# Patient Record
Sex: FEMALE | Race: WHITE | Employment: FULL TIME | URBAN - METROPOLITAN AREA
[De-identification: names, ages, dates, MRNs, and addresses within clinical notes are randomized per-mention and may not be internally consistent; named-entity substitution may affect disease eponyms.]

---

## 2017-01-03 ENCOUNTER — TELEPHONE (OUTPATIENT)
Dept: UROLOGY | Facility: HOSPITAL | Age: 77
End: 2017-01-03

## 2017-01-03 NOTE — TELEPHONE ENCOUNTER
Called patient to confirm apt, pt will cancel the apt for now, states went to see urogyn at [facility] and is not in a study for OAB and trying a new medication, will call back if she wants to return

## 2017-12-14 ENCOUNTER — PATIENT MESSAGE (OUTPATIENT)
Dept: NEUROLOGY | Facility: CLINIC | Age: 77
End: 2017-12-14

## 2017-12-15 NOTE — TELEPHONE ENCOUNTER
Attempted to fax EMG report to patient's requested fax number ([phone]) x 2, and both were undeliverable. Contacted patient at [phone] and explained we had been unable to send the report via fax. She asked that it be mailed.   Placed in outgoing

## 2018-06-28 ENCOUNTER — TELEPHONE (OUTPATIENT)
Dept: UROLOGY | Facility: HOSPITAL | Age: 78
End: 2018-06-28

## 2018-06-28 RX ORDER — ESTRADIOL 0.1 MG/G
CREAM VAGINAL
Qty: 1 TUBE | Refills: 3 | Status: SHIPPED | OUTPATIENT
Start: 2018-06-28

## 2018-09-28 ENCOUNTER — OFFICE VISIT (OUTPATIENT)
Dept: UROLOGY | Facility: HOSPITAL | Age: 78
End: 2018-09-28
Attending: OBSTETRICS & GYNECOLOGY
Payer: MEDICARE

## 2018-09-28 VITALS — SYSTOLIC BLOOD PRESSURE: 126 MMHG | DIASTOLIC BLOOD PRESSURE: 68 MMHG

## 2018-09-28 DIAGNOSIS — N95.2 POSTMENOPAUSAL ATROPHIC VAGINITIS: ICD-10-CM

## 2018-09-28 DIAGNOSIS — N81.11 CYSTOCELE, MIDLINE: ICD-10-CM

## 2018-09-28 DIAGNOSIS — N32.81 OAB (OVERACTIVE BLADDER): Primary | ICD-10-CM

## 2018-09-28 PROCEDURE — 99211 OFF/OP EST MAY X REQ PHY/QHP: CPT

## 2018-09-28 RX ORDER — TROSPIUM CHLORIDE ER 60 MG/1
60 CAPSULE ORAL DAILY
Qty: 30 CAPSULE | Refills: 0 | Status: SHIPPED | OUTPATIENT
Start: 2018-09-28 | End: 2018-10-28

## 2018-09-28 RX ORDER — TROSPIUM CHLORIDE 20 MG/1
20 TABLET, FILM COATED ORAL 2 TIMES DAILY
Refills: 0 | Status: CANCELLED
Start: 2018-09-28

## 2018-09-28 RX ORDER — METRONIDAZOLE 7.5 MG/G
5 GEL VAGINAL NIGHTLY
Qty: 70 G | Refills: 0 | Status: SHIPPED | OUTPATIENT
Start: 2018-09-28 | End: 2019-04-18

## 2018-10-08 PROBLEM — N39.3 STRESS INCONTINENCE OF URINE: Status: ACTIVE | Noted: 2018-10-08

## 2018-10-08 PROBLEM — R35.0 URINE FREQUENCY: Status: ACTIVE | Noted: 2018-10-08

## 2018-10-08 PROBLEM — Z86.000 HISTORY OF LOBULAR CARCINOMA IN SITU (LCIS) OF BREAST: Status: ACTIVE | Noted: 2018-10-08

## 2018-10-08 PROBLEM — N95.2 ATROPHIC VAGINITIS: Status: ACTIVE | Noted: 2018-10-08

## 2018-10-08 PROBLEM — Z90.710 H/O ABDOMINAL HYSTERECTOMY: Status: ACTIVE | Noted: 2018-10-08

## 2018-10-08 PROCEDURE — 87086 URINE CULTURE/COLONY COUNT: CPT | Performed by: OBSTETRICS & GYNECOLOGY

## 2018-10-30 ENCOUNTER — TELEPHONE (OUTPATIENT)
Dept: UROLOGY | Facility: HOSPITAL | Age: 78
End: 2018-10-30

## 2018-11-02 RX ORDER — TOLTERODINE 4 MG/1
4 CAPSULE, EXTENDED RELEASE ORAL DAILY
Qty: 30 CAPSULE | Refills: 0 | Status: SHIPPED | OUTPATIENT
Start: 2018-11-02 | End: 2018-12-05

## 2018-11-02 NOTE — TELEPHONE ENCOUNTER
Phoned pt this am and informed of denial of trospium coverage.  [facility] states pt will need to try either toviaz or tolterodine first. Next step will be to ask Dr Raul De La Garza if he wants pt to try medication or put in appeal.

## 2018-11-02 NOTE — TELEPHONE ENCOUNTER
Called pt after speaking to Dr Shena Celestin. Plan for pt to start detrol. Will trial this for 30 days unless pt has intolerable side effects. Pt verbalized an understanding and agreed to plan.

## 2018-12-05 ENCOUNTER — TELEPHONE (OUTPATIENT)
Dept: UROLOGY | Facility: HOSPITAL | Age: 78
End: 2018-12-05

## 2018-12-05 RX ORDER — TOLTERODINE 4 MG/1
4 CAPSULE, EXTENDED RELEASE ORAL DAILY
Qty: 60 CAPSULE | Refills: 3 | Status: SHIPPED | OUTPATIENT
Start: 2018-12-05 | End: 2019-02-05 | Stop reason: SINTOL

## 2018-12-05 NOTE — TELEPHONE ENCOUNTER
Pt called w/ medication update. Has been taking detrol LA 4mg  Since 10/30/18 d/t insurance not approving trospium. Pt states she also switched compounding companies and is washing pessary in . Pt states she doesn't have the abd pain any longer.

## 2019-02-04 ENCOUNTER — TELEPHONE (OUTPATIENT)
Dept: UROLOGY | Facility: HOSPITAL | Age: 79
End: 2019-02-04

## 2019-02-04 NOTE — TELEPHONE ENCOUNTER
Pt calling to say trospium was denied by her insurance. Pt called insurance and they want pt to try 2 other medications before approving med. Pt states she has tried oxybutynin and detrol in the past. Insurance wants pt to try tolterodine and toviaz.  Aliya Gaytan

## 2019-02-05 ENCOUNTER — TELEPHONE (OUTPATIENT)
Dept: UROLOGY | Facility: HOSPITAL | Age: 79
End: 2019-02-05

## 2019-02-05 RX ORDER — TROSPIUM CHLORIDE ER 60 MG/1
60 CAPSULE ORAL DAILY
Qty: 60 CAPSULE | Refills: 3 | Status: SHIPPED | OUTPATIENT
Start: 2019-02-05 | End: 2019-04-18

## 2019-02-05 NOTE — TELEPHONE ENCOUNTER
Pt is interested in trying trospium now. Was ordered back in Sept, but was denied coverage. PA was denied and wanted pt to try a 2nd medication, either detrol or toviaz.  Pt tried detrol for 30-60 days and had cognitive effects similar to when she tried oxy

## 2019-02-11 ENCOUNTER — TELEPHONE (OUTPATIENT)
Dept: UROLOGY | Facility: HOSPITAL | Age: 79
End: 2019-02-11

## 2019-04-18 PROBLEM — R35.0 URINE FREQUENCY: Status: RESOLVED | Noted: 2018-10-08 | Resolved: 2019-04-18

## 2019-04-18 PROBLEM — Z90.710 H/O ABDOMINAL HYSTERECTOMY: Status: RESOLVED | Noted: 2018-10-08 | Resolved: 2019-04-18

## 2019-07-18 ENCOUNTER — OFFICE VISIT (OUTPATIENT)
Dept: SURGERY | Facility: CLINIC | Age: 79
End: 2019-07-18

## 2019-07-18 VITALS
BODY MASS INDEX: 20.74 KG/M2 | HEIGHT: 64.5 IN | HEART RATE: 63 BPM | DIASTOLIC BLOOD PRESSURE: 79 MMHG | SYSTOLIC BLOOD PRESSURE: 146 MMHG | WEIGHT: 123 LBS

## 2019-07-18 DIAGNOSIS — N39.3 STRESS INCONTINENCE OF URINE: ICD-10-CM

## 2019-07-18 DIAGNOSIS — R15.9 ANAL SPHINCTER INCONTINENCE: ICD-10-CM

## 2019-07-18 DIAGNOSIS — N81.11 CYSTOCELE, MIDLINE: ICD-10-CM

## 2019-07-18 DIAGNOSIS — R15.1 FECAL SMEARING: ICD-10-CM

## 2019-07-18 DIAGNOSIS — K64.2 GRADE III INTERNAL HEMORRHOIDS: Primary | ICD-10-CM

## 2019-07-18 PROCEDURE — 46600 DIAGNOSTIC ANOSCOPY SPX: CPT | Performed by: COLON & RECTAL SURGERY

## 2019-07-18 PROCEDURE — 99205 OFFICE O/P NEW HI 60 MIN: CPT | Performed by: COLON & RECTAL SURGERY

## 2019-07-18 NOTE — PATIENT INSTRUCTIONS
The patient presents today in consultation of Dr. Katelynn Castro for a possible prolapsing hemorrhoid.     The patient states that for approximately the last 2 weeks she has noticed worsening of what she believes is a prolapsing hemorrhoid when she has a bowel moveme is a rectocele repair intact. She has circumferential grade 3 internal hemorrhoids. Examination while the patient is Valsalva laying reveals a prolapsing internal hemorrhoid. There is no evidence of any rectal prolapse at this time.   There is no proctit

## 2019-07-18 NOTE — H&P
New Patient Visit Note       Active Problems      1. Grade III internal hemorrhoids    2. Fecal smearing    3. Cystocele, midline    4. Anal sphincter incontinence    5.  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 The patient states that she has had kidney problems ever since then. I acted as a scribe in this encounter.      The physician obtained a history, independently performed a physical exam, and developed an asse SURGICAL HISTORY  1962    acute glomerular nephritis   • OTHER SURGICAL HISTORY  1972    sacroiliac join fusion   • OTHER SURGICAL HISTORY  1990    LOBECTOMY LEFT UPPER LOBE   • OTHER SURGICAL HISTORY  1996    TRAM BREAST RECONSTRUCTION   • OTHER SURGICAL MG Oral Tab, Take 1 tablet (20 mg total) by mouth once daily. , Disp: 90 tablet, Rfl: 3  •  Pravastatin Sodium 20 MG Oral Tab, Take 1 tablet (20 mg total) by mouth nightly., Disp: 90 tablet, Rfl: 3  •  amLODIPine Besylate 10 MG Oral Tab, Take 1 tablet (10 m Skin: Negative for color change and rash. Neurological: Negative for tremors, syncope and weakness. Hematological: Negative for adenopathy. Does not bruise/bleed easily.    Psychiatric/Behavioral: Negative for behavioral problems and sleep disturbance Rectal exam shows internal hemorrhoid and anal tone abnormal. Rectal exam shows no external hemorrhoid, no fissure, no mass and no tenderness. Pelvic exam was performed with patient prone.    Genitourinary Comments: No Pruritis Ani  No Lichenification  No A for approximately the last 2 weeks she has noticed worsening of what she believes is a prolapsing hemorrhoid when she has a bowel movement. She noticed this when she took out her pessary in order to clean it.   She states that she has to push back in the p Valsalva laying reveals a prolapsing internal hemorrhoid. There is no evidence of any rectal prolapse at this time. There is no proctitis or ulceration. At this time it was discussed with the patient that I recommend undergoing rubber band treatments.

## 2019-07-21 PROBLEM — R15.9 ANAL SPHINCTER INCONTINENCE: Status: ACTIVE | Noted: 2019-07-21

## 2019-07-21 NOTE — PROCEDURES
Procedure:  Anoscopy    Surgeon: Jessica Barnett    Anesthesia: None    Findings: See the progress note attached for all findings    Operative Summary: The patient was placed in a prone position on the proctoscopy table, the hips were flexed in the jackknife p

## 2019-09-05 ENCOUNTER — OFFICE VISIT (OUTPATIENT)
Dept: SURGERY | Facility: CLINIC | Age: 79
End: 2019-09-05
Payer: MEDICARE

## 2019-09-05 VITALS
HEART RATE: 66 BPM | BODY MASS INDEX: 19.99 KG/M2 | HEIGHT: 65 IN | WEIGHT: 120 LBS | DIASTOLIC BLOOD PRESSURE: 72 MMHG | SYSTOLIC BLOOD PRESSURE: 153 MMHG | TEMPERATURE: 97 F

## 2019-09-05 DIAGNOSIS — K64.2 GRADE III INTERNAL HEMORRHOIDS: Primary | ICD-10-CM

## 2019-09-05 PROCEDURE — 46221 LIGATION OF HEMORRHOID(S): CPT | Performed by: COLON & RECTAL SURGERY

## 2019-09-05 NOTE — PROGRESS NOTES
Follow Up Visit Note       Active Problems      1. Grade III internal hemorrhoids          Chief Complaint   Patient presents with:  Hemorrhoid Banding        History of Present Illness        Allergies  Juan Denney is allergic to sulfa antibiotics.     Past Medi rectocele repair   • TONSILLECTOMY      x2 once as a child and then as a adult        The family history and social history have been reviewed by me today.     Family History   Problem Relation Age of Onset   • Hypertension Brother    • Hypertension Father by mouth before breakfast., Disp: 90 tablet, Rfl: 3  •  ALPRAZolam 0.5 MG Oral Tab, Take 0.5 tablets (0.25 mg total) by mouth nightly as needed for Anxiety. , Disp: 90 tablet, Rfl: 0  •  Cyanocobalamin (VITAMIN B 12) 100 MCG Oral Lozenge, Take 1,000 mcg by a rubber band and injection of a 5% phenol solution into the base of the rubberbanded hemorrhoid. The patient tolerated the procedure well. The patient remained stable throughout the entire procedure within my office.     The patient was asked to recove

## 2019-09-05 NOTE — PATIENT INSTRUCTIONS
At today's office visit we treated a right anterolateral internal hemorrhoid. At today's visit, the patient underwent an uncomplicated application of a rubber band and injection of a 5% phenol solution into the base of the rubberbanded hemorrhoid.     Bucyrus Lorna

## 2019-09-05 NOTE — PROCEDURES
Pre op diagnosis: Internal Hemorrhoids    Post op diagnosis: Same    Procedure: Anoscopy with O-ring Rubber Band Ligation of Internal Hemorrhoids    Surgeon: Kellee Newberry MD    History of present illness:    This patient has internal hemorrhoids that are s

## 2019-09-19 ENCOUNTER — OFFICE VISIT (OUTPATIENT)
Dept: SURGERY | Facility: CLINIC | Age: 79
End: 2019-09-19
Payer: MEDICARE

## 2019-09-19 VITALS
RESPIRATION RATE: 16 BRPM | DIASTOLIC BLOOD PRESSURE: 73 MMHG | BODY MASS INDEX: 19.99 KG/M2 | WEIGHT: 120 LBS | HEIGHT: 65 IN | SYSTOLIC BLOOD PRESSURE: 147 MMHG | HEART RATE: 65 BPM

## 2019-09-19 DIAGNOSIS — K64.2 GRADE III INTERNAL HEMORRHOIDS: Primary | ICD-10-CM

## 2019-09-19 DIAGNOSIS — R15.9 ANAL SPHINCTER INCONTINENCE: ICD-10-CM

## 2019-09-19 PROCEDURE — 46221 LIGATION OF HEMORRHOID(S): CPT | Performed by: COLON & RECTAL SURGERY

## 2019-09-19 NOTE — PROGRESS NOTES
Follow Up Visit Note       Active Problems      1. Grade III internal hemorrhoids    2. Anal sphincter incontinence          Chief Complaint   Patient presents with:  Hemorrhoid Banding: EST PT 2nd banding. PT denies any new issues or concerns.          His gland-benign   • SURGERY - EXTERNAL  1/14    with dr Tyler Kessler   • SURGERY - EXTERNAL  10/13    rectocele repair   • TONSILLECTOMY      x2 once as a child and then as a adult        The family history and social history have been reviewed by me today.     Family capsule, Rfl: 3  •  Levothyroxine Sodium 50 MCG Oral Tab, Take 1 tablet (50 mcg total) by mouth before breakfast., Disp: 90 tablet, Rfl: 3  •  ALPRAZolam 0.5 MG Oral Tab, Take 0.5 tablets (0.25 mg total) by mouth nightly as needed for Anxiety. , Disp: 90 ta hemorrhoid. At today's visit, the patient underwent an uncomplicated application of a rubber band and injection of a 5% phenol solution into the base of the rubberbanded hemorrhoid. The patient tolerated the procedure well.   The patient remained stab

## 2019-09-19 NOTE — PROGRESS NOTES
Post Operative Visit Note       Active Problems  No diagnosis found. Chief Complaint   Patient presents with:  Hemorrhoid Banding: EST PT 2nd banding. PT denies any new issues or concerns.           History of Present Illness         Allergies  Farrell Olszewski is dr Shena Celestin   • SURGERY - EXTERNAL  10/13    rectocele repair   • TONSILLECTOMY      x2 once as a child and then as a adult        The family history and social history have been reviewed by me today.     Family History   Problem Relation Age of Onset   • Hyper Tab, Take 1 tablet (50 mcg total) by mouth before breakfast., Disp: 90 tablet, Rfl: 3  •  ALPRAZolam 0.5 MG Oral Tab, Take 0.5 tablets (0.25 mg total) by mouth nightly as needed for Anxiety. , Disp: 90 tablet, Rfl: 0  •  Cyanocobalamin (VITAMIN B 12) 100 MC

## 2019-10-07 ENCOUNTER — OFFICE VISIT (OUTPATIENT)
Dept: SURGERY | Facility: CLINIC | Age: 79
End: 2019-10-07

## 2019-10-07 VITALS
DIASTOLIC BLOOD PRESSURE: 75 MMHG | BODY MASS INDEX: 19.99 KG/M2 | SYSTOLIC BLOOD PRESSURE: 148 MMHG | HEART RATE: 63 BPM | WEIGHT: 120 LBS | TEMPERATURE: 98 F | HEIGHT: 65 IN

## 2019-10-07 DIAGNOSIS — K64.2 GRADE III INTERNAL HEMORRHOIDS: Primary | ICD-10-CM

## 2019-10-07 PROCEDURE — 46221 LIGATION OF HEMORRHOID(S): CPT | Performed by: COLON & RECTAL SURGERY

## 2019-10-07 NOTE — PROGRESS NOTES
Follow Up Visit Note       Active Problems      1. Grade III internal hemorrhoids          Chief Complaint   Patient presents with:  Hemorrhoid Banding: Patient is here for 3rd hemorrhoid banding. --- First two bandings went well.  Denies constipation, diarr CATARACT REMOVAL AND LENS IMPLANTS   • OTHER SURGICAL HISTORY  2013    nodule removed by parotid gland-benign   • SURGERY - EXTERNAL  1/14    with dr Bri Galvan   • SURGERY - EXTERNAL  10/13    rectocele repair   • TONSILLECTOMY      x2 once as a child and then hydrochlorothiazide 12.5 MG Oral Cap, Take 1 capsule (12.5 mg total) by mouth every morning., Disp: 90 capsule, Rfl: 3  •  Levothyroxine Sodium 50 MCG Oral Tab, Take 1 tablet (50 mcg total) by mouth before breakfast., Disp: 90 tablet, Rfl: 3  •  ALPRAZolam (primary encounter diagnosis)    Plan   At today's office visit we treated a right posterior lateral internal hemorrhoid.     At today's visit, the patient underwent an uncomplicated application of a rubber band and injection of a 5% phenol solution into th

## 2019-10-09 NOTE — PROCEDURES
Pre op diagnosis: Internal Hemorrhoids    Post op diagnosis: Same    Procedure: Anoscopy with O-ring Rubber Band Ligation of Internal Hemorrhoids    Surgeon: Jessica Barnett MD    History of present illness:    This patient has internal hemorrhoids that are s

## 2019-11-04 ENCOUNTER — OFFICE VISIT (OUTPATIENT)
Dept: SURGERY | Facility: CLINIC | Age: 79
End: 2019-11-04
Payer: MEDICARE

## 2019-11-04 VITALS
WEIGHT: 125 LBS | HEART RATE: 51 BPM | HEIGHT: 65 IN | DIASTOLIC BLOOD PRESSURE: 65 MMHG | SYSTOLIC BLOOD PRESSURE: 133 MMHG | TEMPERATURE: 98 F | BODY MASS INDEX: 20.83 KG/M2

## 2019-11-04 DIAGNOSIS — K64.2 GRADE III INTERNAL HEMORRHOIDS: Primary | ICD-10-CM

## 2019-11-04 PROCEDURE — 46221 LIGATION OF HEMORRHOID(S): CPT | Performed by: COLON & RECTAL SURGERY

## 2019-11-04 RX ORDER — NAPROXEN 500 MG/1
500 TABLET ORAL AS NEEDED
COMMUNITY
End: 2021-04-29

## 2019-11-04 NOTE — PROCEDURES
Pre op diagnosis: Internal Hemorrhoids    Post op diagnosis: Same    Procedure: Anoscopy with O-ring Rubber Band Ligation of Internal Hemorrhoids    Surgeon: Jer Stewart MD    History of present illness:    This patient has internal hemorrhoids that are s

## 2019-11-04 NOTE — PATIENT INSTRUCTIONS
At today's office visit we treated a left posterior lateral internal hemorrhoid. At today's visit, the patient underwent an uncomplicated application of a rubber band and injection of a 5% phenol solution into the base of the rubberbanded hemorrhoid.

## 2019-11-04 NOTE — PROGRESS NOTES
Follow Up Visit Note       Active Problems      1. Grade III internal hemorrhoids          Chief Complaint   Patient presents with:  Hemorrhoid Bandinth banding today. Pt states she is not having any rectal pain or bleeding.          History of Present SURGERY - EXTERNAL  1/14    with dr Alan Rocha   • SURGERY - EXTERNAL  10/13    rectocele repair   • TONSILLECTOMY      x2 once as a child and then as a adult        The family history and social history have been reviewed by me today.     Family History   Proble total) by mouth nightly., Disp: 90 tablet, Rfl: 3  •  amLODIPine Besylate 10 MG Oral Tab, Take 1 tablet (10 mg total) by mouth daily. , Disp: 90 tablet, Rfl: 3  •  Trospium Chloride ER 60 MG Oral Capsule SR 24 Hr, Take 1 capsule (60 mg total) by mouth daily weakness. Hematological: Negative for adenopathy. Does not bruise/bleed easily. Psychiatric/Behavioral: Negative for behavioral problems and sleep disturbance.         Physical Findings   /65 (BP Location: Left arm)   Pulse 51   Temp 97.6 °F (36.4

## 2019-11-18 ENCOUNTER — TELEPHONE (OUTPATIENT)
Dept: UROLOGY | Facility: HOSPITAL | Age: 79
End: 2019-11-18

## 2019-11-18 NOTE — TELEPHONE ENCOUNTER
Pt. Called inquiring if she could try a different OAB medication. Pt is on Trospium ER 60mg is not working anymore. Pt wondering if she did the TRospium 20mg TID instead would that help. Pt previously took oxybutnin but had cognitive issues.    After this

## 2019-11-21 ENCOUNTER — OFFICE VISIT (OUTPATIENT)
Dept: SURGERY | Facility: CLINIC | Age: 79
End: 2019-11-21
Payer: MEDICARE

## 2019-11-21 VITALS
BODY MASS INDEX: 21.33 KG/M2 | WEIGHT: 128 LBS | HEIGHT: 65 IN | HEART RATE: 57 BPM | TEMPERATURE: 98 F | DIASTOLIC BLOOD PRESSURE: 63 MMHG | SYSTOLIC BLOOD PRESSURE: 116 MMHG

## 2019-11-21 DIAGNOSIS — K64.2 GRADE III INTERNAL HEMORRHOIDS: Primary | ICD-10-CM

## 2019-11-21 PROCEDURE — 99212 OFFICE O/P EST SF 10 MIN: CPT | Performed by: COLON & RECTAL SURGERY

## 2019-11-21 PROCEDURE — 46600 DIAGNOSTIC ANOSCOPY SPX: CPT | Performed by: COLON & RECTAL SURGERY

## 2019-11-21 NOTE — PROGRESS NOTES
Follow Up Visit Note       Active Problems      1.  Grade III internal hemorrhoids          Chief Complaint   Patient presents with:  Hemorrhoid Banding: est pt - here for possible 4th banding, Pt states last band came off on the same day,         History o OTHER SURGICAL HISTORY  1962    acute glomerular nephritis   • OTHER SURGICAL HISTORY  1972    sacroiliac join fusion   • OTHER SURGICAL HISTORY  1990    LOBECTOMY LEFT UPPER LOBE   • OTHER SURGICAL HISTORY  1996    TRAM BREAST RECONSTRUCTION   • OTHER MARCIO Sertraline HCl 100 MG Oral Tab, Take 1 tablet (100 mg total) by mouth daily. , Disp: 90 tablet, Rfl: 1  •  carvedilol 25 MG Oral Tab, Take 1 tablet (25 mg total) by mouth 2 (two) times daily with meals. , Disp: 180 tablet, Rfl: 1  •  naproxen 500 MG Oral Tab breath and wheezing. Cardiovascular: Negative for chest pain, palpitations and leg swelling. Gastrointestinal: Negative for abdominal distention, abdominal pain, anal bleeding, blood in stool, constipation, diarrhea, nausea and vomiting.    Odalis Brown reveals her to have no significant external hemorrhoids. The sphincter is slightly weakened with 1/2 basal, 1/2 maximum squeeze pressure.   She has evidence of circumferential rubber band treatments with no hemorrhoids poking through in between the previou

## 2019-11-21 NOTE — PATIENT INSTRUCTIONS
I am seeing this patient for continued care of her internal hemorrhoids. She has had several rubber band treatments. She had some significant bleeding for 2 weeks after her last rubber band.     She had some concern that the last 2 rubber bands have falle

## 2019-11-21 NOTE — PROCEDURES
Procedure:  Anoscopy    Surgeon: Yeni Castro    Anesthesia: None    Findings: See the progress note attached for all findings    Operative Summary: The patient was placed in a prone position on the proctoscopy table, the hips were flexed in the jackknife p

## 2020-03-24 ENCOUNTER — TELEPHONE (OUTPATIENT)
Dept: UROLOGY | Facility: HOSPITAL | Age: 80
End: 2020-03-24

## 2020-03-24 NOTE — TELEPHONE ENCOUNTER
Called patient letting her know we received a refill request from [facility] for her Estrace Cream.  Inquired if pt still using, pt verified she is and does need refill. Informed pt. Will fax after we hang up. Fax sent, confirmed rec'd.

## 2021-07-30 ENCOUNTER — OFFICE VISIT (OUTPATIENT)
Dept: UROLOGY | Facility: HOSPITAL | Age: 81
End: 2021-07-30
Attending: OBSTETRICS & GYNECOLOGY
Payer: MEDICARE

## 2021-07-30 VITALS — WEIGHT: 126 LBS | HEIGHT: 65 IN | BODY MASS INDEX: 20.99 KG/M2 | TEMPERATURE: 97 F

## 2021-07-30 DIAGNOSIS — N39.41 URGE INCONTINENCE OF URINE: ICD-10-CM

## 2021-07-30 DIAGNOSIS — N81.11 CYSTOCELE, MIDLINE: ICD-10-CM

## 2021-07-30 PROCEDURE — 57160 INSERT PESSARY/OTHER DEVICE: CPT

## 2021-07-30 PROCEDURE — 99212 OFFICE O/P EST SF 10 MIN: CPT

## 2021-08-12 ENCOUNTER — TELEPHONE (OUTPATIENT)
Dept: UROLOGY | Facility: HOSPITAL | Age: 81
End: 2021-08-12

## 2021-08-12 NOTE — TELEPHONE ENCOUNTER
Patient called pessary #4incontinence dish placed 7/30/21 feels like it slipped and is uncomfortable. Had #3 ring with support prior. Patient will take pessary out plan to come in tomorrow unable to get here today.

## 2021-08-13 ENCOUNTER — OFFICE VISIT (OUTPATIENT)
Dept: UROLOGY | Facility: HOSPITAL | Age: 81
End: 2021-08-13
Attending: OBSTETRICS & GYNECOLOGY
Payer: MEDICARE

## 2021-08-13 VITALS — RESPIRATION RATE: 18 BRPM | TEMPERATURE: 97 F | WEIGHT: 126 LBS | BODY MASS INDEX: 21 KG/M2

## 2021-08-13 DIAGNOSIS — N81.11 CYSTOCELE, MIDLINE: Primary | ICD-10-CM

## 2021-08-13 PROCEDURE — 99212 OFFICE O/P EST SF 10 MIN: CPT

## 2021-08-13 PROCEDURE — 99211 OFF/OP EST MAY X REQ PHY/QHP: CPT

## 2021-08-13 NOTE — PROCEDURES
S:     Patient here for pessary check - # 4 Incontinence dish was placed on 7/31/21 -patient previously had  # 3 ring and felt prolapse slipping around the device - The incontinence dish does \" not sit right\" per patient.  Is self care - but has not remov

## 2022-01-25 ENCOUNTER — TELEPHONE (OUTPATIENT)
Dept: UROLOGY | Facility: HOSPITAL | Age: 82
End: 2022-01-25

## 2022-01-25 NOTE — TELEPHONE ENCOUNTER
TC from pt who self-maintains pessary that is asking for assistance. She is currently using #4 incontinence dish, not the #4 ring w/support.  She is instructed on proper placement and informed to call us back if she has any more problems or wants to come in

## 2022-08-04 ENCOUNTER — TELEPHONE (OUTPATIENT)
Dept: UROLOGY | Facility: CLINIC | Age: 82
End: 2022-08-04

## 2022-08-04 NOTE — TELEPHONE ENCOUNTER
Patient last visit with Dr. Mary Temple 7/2021 - continue to use to Aura Temple - can refill Estrace to [facility] x one - needs appt for yearly f/u  Refill request faxed to [facility]

## 2022-09-27 ENCOUNTER — TELEPHONE (OUTPATIENT)
Dept: UROLOGY | Facility: CLINIC | Age: 82
End: 2022-09-27

## 2022-09-27 NOTE — TELEPHONE ENCOUNTER
TC to pt. Pt left message on RN line yesterday saying her pessary felt like it wasn't in the correct position. Phoned pt back and LM on  saying she was due for yearly w/ Dr Shyla Thomas. Offered appt this Friday if works for pt. Request a call back.

## 2022-09-28 NOTE — TELEPHONE ENCOUNTER
Pt states she will be out of town 9-30-22 and would like to schedule an appt for the following week.   Called, LM, pend for call back

## 2022-09-28 NOTE — TELEPHONE ENCOUNTER
Pt called, LM request call back. Called back, [place], offered appt with Dr. Joselito Parrish 9-30-22.   PEND for call back

## 2022-09-29 NOTE — TELEPHONE ENCOUNTER
Called pt again, [facility], encouraged to call office if she needs pessary appointment before leaving [facility]. Otherwise, encouraged pt to call the office to schedule a pessary appointment upon her return next week.

## 2022-10-04 ENCOUNTER — TELEPHONE (OUTPATIENT)
Dept: UROLOGY | Facility: CLINIC | Age: 82
End: 2022-10-04

## 2022-10-21 ENCOUNTER — OFFICE VISIT (OUTPATIENT)
Dept: UROLOGY | Facility: CLINIC | Age: 82
End: 2022-10-21
Attending: OBSTETRICS & GYNECOLOGY
Payer: MEDICARE

## 2022-10-21 VITALS — BODY MASS INDEX: 20.99 KG/M2 | WEIGHT: 126 LBS | HEIGHT: 65 IN | TEMPERATURE: 98 F

## 2022-10-21 DIAGNOSIS — N39.3 FEMALE STRESS INCONTINENCE: ICD-10-CM

## 2022-10-21 DIAGNOSIS — N39.41 URGE INCONTINENCE OF URINE: ICD-10-CM

## 2022-10-21 DIAGNOSIS — R35.1 NOCTURIA: ICD-10-CM

## 2022-10-21 DIAGNOSIS — N81.11 CYSTOCELE, MIDLINE: Primary | ICD-10-CM

## 2022-10-21 PROCEDURE — 57160 INSERT PESSARY/OTHER DEVICE: CPT

## 2022-10-21 PROCEDURE — 99212 OFFICE O/P EST SF 10 MIN: CPT

## 2023-04-24 ENCOUNTER — OFFICE VISIT (OUTPATIENT)
Dept: UROLOGY | Facility: CLINIC | Age: 83
End: 2023-04-24
Attending: OBSTETRICS & GYNECOLOGY
Payer: MEDICARE

## 2023-04-24 VITALS — TEMPERATURE: 97 F | BODY MASS INDEX: 20.99 KG/M2 | WEIGHT: 126 LBS | HEIGHT: 65 IN

## 2023-04-24 DIAGNOSIS — N95.2 POST-MENOPAUSAL ATROPHIC VAGINITIS: ICD-10-CM

## 2023-04-24 DIAGNOSIS — R35.1 NOCTURIA: ICD-10-CM

## 2023-04-24 DIAGNOSIS — N39.41 URGE URINARY INCONTINENCE: ICD-10-CM

## 2023-04-24 DIAGNOSIS — N81.11 CYSTOCELE, MIDLINE: Primary | ICD-10-CM

## 2023-04-24 DIAGNOSIS — N81.84 PELVIC MUSCLE WASTING: ICD-10-CM

## 2023-04-24 PROCEDURE — 99212 OFFICE O/P EST SF 10 MIN: CPT

## 2023-04-24 PROCEDURE — 57160 INSERT PESSARY/OTHER DEVICE: CPT

## 2023-04-24 RX ORDER — VIBEGRON 75 MG/1
75 TABLET, FILM COATED ORAL DAILY
Qty: 30 TABLET | Refills: 11 | Status: SHIPPED | OUTPATIENT
Start: 2023-04-24 | End: 2023-05-24

## 2023-04-24 RX ORDER — ESTRADIOL 0.1 MG/G
CREAM VAGINAL
Qty: 1 EACH | Refills: 3 | Status: SHIPPED | OUTPATIENT
Start: 2023-04-24

## 2023-04-26 ENCOUNTER — TELEPHONE (OUTPATIENT)
Dept: UROLOGY | Facility: CLINIC | Age: 83
End: 2023-04-26

## 2023-04-26 NOTE — TELEPHONE ENCOUNTER
Incoming call from [facility] to clarify strength of electronically prescribed estrace cream.    Original order entered 4/24/23 Per Jane MELENDEZ  With concentration of 0.1mg/gm    Per standing estradiol orders for [facility]/Dr Wang prescribes 0.2mg/g; pt to apply 0.25gm to vagina twice week with 15g tube/3 refills. Clarification left with pharmacy on VM.

## 2023-04-27 ENCOUNTER — TELEPHONE (OUTPATIENT)
Dept: UROLOGY | Facility: CLINIC | Age: 83
End: 2023-04-27

## 2023-04-27 NOTE — TELEPHONE ENCOUNTER
Pt calling to report gemtesa was $ 500 for a 30 d supply. Instructed pt to call insurance or look online to see whether myrbetriq is covered. Pt has tried trospium in the past, but doesn't recall if it helped. Informed pt trospium was an option as well. Instructed pt to ask pharmacist whether we could put in PA for gemtesa, as they can see more info on their side. Pt verbalized an understanding and agrees w/ plan. All questions answered.

## 2023-04-27 NOTE — TELEPHONE ENCOUNTER
Dru Werner called to verify Estrace dosing, pt was previously on 0.4mg/gm, applying 0.25 gm to vagina 3 times weekly. Per Dr. Ping Marquez standing order for Estrace through [facility], this is correct. Confirmed pt needs 0.4mg/gm, applying 0.25 gm to vagina 3 times weekly, in a 15 gram tube with 3 refills. Correct dosing orders confirmed.

## 2023-04-28 NOTE — TELEPHONE ENCOUNTER
Pt called back to say she spoke to her insurance and the myrbetriq is most cost effective.  Pt takes amlodipine for her b/p.   3/27/23-B/P-146/70  10/18//59  Will discuss w/ NORA Guzman

## 2023-05-11 ENCOUNTER — OFFICE VISIT (OUTPATIENT)
Dept: UROLOGY | Facility: CLINIC | Age: 83
End: 2023-05-11
Attending: OBSTETRICS & GYNECOLOGY
Payer: MEDICARE

## 2023-05-11 VITALS
DIASTOLIC BLOOD PRESSURE: 62 MMHG | WEIGHT: 126 LBS | SYSTOLIC BLOOD PRESSURE: 124 MMHG | BODY MASS INDEX: 20.99 KG/M2 | HEIGHT: 65 IN

## 2023-05-11 DIAGNOSIS — N95.2 POST-MENOPAUSAL ATROPHIC VAGINITIS: ICD-10-CM

## 2023-05-11 DIAGNOSIS — N81.11 CYSTOCELE, MIDLINE: Primary | ICD-10-CM

## 2023-05-11 DIAGNOSIS — N39.41 URGE URINARY INCONTINENCE: ICD-10-CM

## 2023-05-11 DIAGNOSIS — N81.84 PELVIC MUSCLE WASTING: ICD-10-CM

## 2023-05-11 DIAGNOSIS — R35.1 NOCTURIA: ICD-10-CM

## 2023-05-11 PROCEDURE — 99212 OFFICE O/P EST SF 10 MIN: CPT

## 2023-08-17 ENCOUNTER — OFFICE VISIT (OUTPATIENT)
Dept: UROLOGY | Facility: CLINIC | Age: 83
End: 2023-08-17
Attending: OBSTETRICS & GYNECOLOGY
Payer: MEDICARE

## 2023-08-17 VITALS
SYSTOLIC BLOOD PRESSURE: 120 MMHG | BODY MASS INDEX: 20.99 KG/M2 | DIASTOLIC BLOOD PRESSURE: 70 MMHG | WEIGHT: 126 LBS | HEIGHT: 65 IN

## 2023-08-17 DIAGNOSIS — N95.2 POST-MENOPAUSAL ATROPHIC VAGINITIS: ICD-10-CM

## 2023-08-17 DIAGNOSIS — N39.41 URGE URINARY INCONTINENCE: ICD-10-CM

## 2023-08-17 DIAGNOSIS — R35.1 NOCTURIA: ICD-10-CM

## 2023-08-17 DIAGNOSIS — N81.11 CYSTOCELE, MIDLINE: Primary | ICD-10-CM

## 2023-08-17 DIAGNOSIS — N81.84 PELVIC MUSCLE WASTING: ICD-10-CM

## 2023-08-17 PROCEDURE — 57160 INSERT PESSARY/OTHER DEVICE: CPT

## 2023-08-17 PROCEDURE — 99212 OFFICE O/P EST SF 10 MIN: CPT

## 2023-08-17 NOTE — PROGRESS NOTES
Pt presents to follow up bulge  Doing well with #4 large knob incontinence dish pessary, home care  Reports pessary is comfortable but occasionally difficult to remove  Denies discharge  Denies bleeding  Denies s/sx of UTI  Bowels regular with fiber  Pt is removing pessary at home every 2 weeks  Pt is using vaginal estrogen cream twice weekly    Happy with pessary, feels supported  She is not currently interested in surgical management of her pelvic organ prolapse. Taking Myrbetriq 50 mg daily, has some dry mouth    Has tried in the past:  Trospium XR 60 mg daily - not helpful  Gemtesa - not covered by insurance    UDS: 2013  No KEVIN  No DO  care home 405  150/15  373/125    Urogynecology Summary:  Urogynecology Summary  Prolapse: Yes  KEVIN: Yes  Urge Incontinence: Yes  Nocturia Frequency: 3  Frequency: 2 hours  Incomplete emptying: No  Constipation: No (every third day fiber, sometimes irregular)  Wears pad day?: 2 (light)  Wears Pad Night?: 1 (heavy)  Activities are limited by UI/POP?: No  Currently Sexually Active: No  Avoids sexual activity due to: Other (pain and bulging)      Vitals:   08/17/23  1459   BP: 120/70       GENERAL EXAM:  GENERAL:  NAD  HEAD: NC/AT  EYES: symmetric bilaterally. No icterus. Sclera w/o injection  NECK: no masses  LUNGS:  Normal resp effort  ABDOMEN:  Soft, no mass  MUSK: normal gait, ROM wnl. No edema  PSYCH: A&Ox3. Recent and remote memory intact    PELVIC EXAM:  Ext.  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 Discussed pessary management including benefits and risks. Discussed importance of keeping regularly scheduled pessary checks in prevention of complications related to pessary use. Discussed mgmt of vulvovaginal atrophy with vaginal estrogen cream. Reviewed associated benefits, risks, alternatives, and goals. Recommend low dose twice weekly mgmt   Discussed dietary and behavioral modifications for mgmt of urinary symptoms  Discussed weight management and benefits of weight loss on urinary symptoms  Reviewed AUA/SUFU guidelines on mgmt of non-neurogenic OAB  Discussed pharmacologic and nonpharmacologic mgmt options of urinary symptoms - reviewed risks, benefits, alternatives, and goals of treatment  Discussed specific risks related to OAB meds including, but not limited to dry mouth, constipation, blurry vision, cognitive changes, and BP elevation. Continue pessary management, home care  Continue vag estrogen twice weekly  Continue Myrbetriq for UUI  Daily pelvic exercises  Bowel management reviewed  Call with s/sx of UTI, problems with pessary   All questions answered  Pt understands and agrees to plan       Return in about 4 weeks (around 9/14/2023) for pessary care, sooner prn .       Vitaliy Cool PA-C

## 2023-09-28 ENCOUNTER — OFFICE VISIT (OUTPATIENT)
Dept: UROLOGY | Facility: CLINIC | Age: 83
End: 2023-09-28
Attending: OBSTETRICS & GYNECOLOGY
Payer: MEDICARE

## 2023-09-28 VITALS
HEIGHT: 65 IN | BODY MASS INDEX: 20.99 KG/M2 | DIASTOLIC BLOOD PRESSURE: 65 MMHG | SYSTOLIC BLOOD PRESSURE: 120 MMHG | WEIGHT: 126 LBS

## 2023-09-28 DIAGNOSIS — N39.3 FEMALE STRESS INCONTINENCE: ICD-10-CM

## 2023-09-28 DIAGNOSIS — N39.41 URGE URINARY INCONTINENCE: ICD-10-CM

## 2023-09-28 DIAGNOSIS — N81.11 CYSTOCELE, MIDLINE: Primary | ICD-10-CM

## 2023-09-28 DIAGNOSIS — N95.2 POST-MENOPAUSAL ATROPHIC VAGINITIS: ICD-10-CM

## 2023-09-28 DIAGNOSIS — N81.84 PELVIC MUSCLE WASTING: ICD-10-CM

## 2023-09-28 PROCEDURE — 99212 OFFICE O/P EST SF 10 MIN: CPT

## 2023-09-28 PROCEDURE — 57160 INSERT PESSARY/OTHER DEVICE: CPT

## 2023-09-28 RX ORDER — VIBEGRON 75 MG/1
75 TABLET, FILM COATED ORAL DAILY
Qty: 90 TABLET | Refills: 3 | Status: SHIPPED | OUTPATIENT
Start: 2023-09-28 | End: 2023-10-28

## 2023-09-28 NOTE — PROGRESS NOTES
Pt presents to follow up bulge  Doing well with #4 large knob incontinence pessary, home care  Reports pessary is comfortable, but feels like it descends down  Complains of UUI  Some white discharge - not bothersome  Denies bleeding  Denies s/sx of UTI  Bowels regular  Pt is removing pessary at home every 2-3 weeks  Pt is using vaginal estrogen cream three times weekly  Taking Myrbetriq 50 mg daily - not helpful  Has tried Trospium - not helpful    Happy with pessary, feels supported  She is not currently interested in surgical management of her pelvic organ prolapse. Urogynecology Summary:  Urogynecology Summary  Prolapse: Yes  KEVIN: Yes  Urge Incontinence: Yes  Nocturia Frequency: 3  Frequency: 2 hours  Incomplete emptying: No  Constipation: No  Wears pad day?: 3 (light)  Wears Pad Night?: 1 (heavy)  Activities are limited by UI/POP?: Yes (both)  Currently Sexually Active: No  Avoids sexual activity due to: Prolapse      Vitals:   09/28/23  1102   BP: 120/65       GENERAL EXAM:  GENERAL:  NAD  HEAD: NC/AT  EYES: symmetric bilaterally. No icterus. Sclera w/o injection  NECK: no masses  LUNGS:  Normal resp effort  ABDOMEN:  Soft, no mass  MUSK: normal gait, ROM wnl. No edema  PSYCH: A&Ox3. Recent and remote memory intact    PELVIC EXAM:  Ext.  Gen: + atrophy, no lesions  Urethra: caruncle, nontender  Bladder: some fullness, non tender  Vagina: +atrophy, no lesions, no ulcerations, no bleeding  Cervix: absent   Uterus: absent  Adnexa: no masses  Perineum: intact, non tender        PELVIC SUPPORT:  0 apex  2 cystocele  0 rectocele    Her pessary was removed, cleaned, and given back to patient  #5 small knob incontinence dish inserted, comfortable  Patient able to demonstrate self removal and insertion  Patient able to void freely in the toilet    Impression/Plan:  (N81.11) Cystocele, midline  (primary encounter diagnosis)    (N95.2) Post-menopausal atrophic vaginitis    (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 Reviewed associated benefits, risks, alternatives, and goals. Recommend low dose twice weekly mgmt   Discussed management of pelvic organ prolapse including but not limited to behavioral modifications, conservative options, and surgical management. Discussed pessary management including benefits and risks. Discussed importance of keeping regularly scheduled pessary checks in prevention of complications related to pessary use. Continue pessary management, home care  Continue vag estrogen twice weekly  Finish Myrbetriq (has 3 weeks left), start Gemtesa for UUI  Daily pelvic exercises  Bowel management reviewed  Call with s/sx of UTI, problems with pessary   All questions answered  Pt understands and agrees to plan       Return in about 10 weeks (around 12/7/2023) for UUI and pessary care, sooner prn .       Nora Puri PA-C

## 2023-12-12 ENCOUNTER — TELEPHONE (OUTPATIENT)
Dept: UROLOGY | Facility: CLINIC | Age: 83
End: 2023-12-12

## 2023-12-12 NOTE — TELEPHONE ENCOUNTER
Spoke with patient regarding missed 10 week UUI Pessary Check appointment this morning. Patient states she missed due to having had COVID and other personal reasons.   Patient able to reschedule for next Tuesday, 12/19/24 and confirmed date and time and location

## 2023-12-19 ENCOUNTER — OFFICE VISIT (OUTPATIENT)
Dept: UROLOGY | Facility: CLINIC | Age: 83
End: 2023-12-19
Attending: OBSTETRICS & GYNECOLOGY
Payer: MEDICARE

## 2023-12-19 VITALS
BODY MASS INDEX: 20.99 KG/M2 | SYSTOLIC BLOOD PRESSURE: 120 MMHG | HEIGHT: 65 IN | DIASTOLIC BLOOD PRESSURE: 70 MMHG | WEIGHT: 126 LBS

## 2023-12-19 DIAGNOSIS — N39.41 URGE URINARY INCONTINENCE: ICD-10-CM

## 2023-12-19 DIAGNOSIS — N81.11 CYSTOCELE, MIDLINE: Primary | ICD-10-CM

## 2023-12-19 DIAGNOSIS — N81.84 PELVIC MUSCLE WASTING: ICD-10-CM

## 2023-12-19 DIAGNOSIS — N95.2 POST-MENOPAUSAL ATROPHIC VAGINITIS: ICD-10-CM

## 2023-12-19 DIAGNOSIS — R35.1 NOCTURIA: ICD-10-CM

## 2023-12-19 PROCEDURE — 99212 OFFICE O/P EST SF 10 MIN: CPT

## 2023-12-19 RX ORDER — VIBEGRON 75 MG/1
TABLET, FILM COATED ORAL
COMMUNITY

## 2023-12-19 NOTE — PROGRESS NOTES
Pt presents to follow up bulge  Doing well with #5 small knob incontinence dish pessary, home care  Previously fitted with #4 large knob incontinence dish, painful to remove  Reports pessary is comfortable   Denies discharge  Denies bleeding  Denies s/sx of UTI  Bowels regular, miralax prn   Pt is removing pessary at home every 2 weeks  Pt is using vaginal estrogen cream twice weekly    Happy with pessary, feels supported  She is not currently interested in surgical management of her pelvic organ prolapse. UUI: currently taking gemtesa 75 mg daily  She reports minimal improvement (1/10)  Better symptom control than Myrbetriq or Trospium  Still has incontinence     Urogynecology Summary:  Urogynecology Summary  Prolapse: Yes  KEVIN: Yes  Urge Incontinence: Yes  Nocturia Frequency: 3  Frequency: 2 hours  Incomplete emptying: No  Constipation: No  Wears pad day?: 2 (1-2 light)  Wears Pad Night?: 1 (heavy)  Activities are limited by UI/POP?: Yes (afraid of leaking)  Currently Sexually Active: Yes      Vitals:  Vitals:    12/19/23 0946   BP: 120/70       GENERAL EXAM:  GENERAL:  NAD  HEAD: NC/AT  EYES: symmetric bilaterally. No icterus. Sclera w/o injection  NECK: no masses  LUNGS:  Normal resp effort  ABDOMEN:  Soft, no mass  MUSK: normal gait, ROM wnl. No edema  PSYCH: A&Ox3. Recent and remote memory intact    PELVIC EXAM:  Ext. Gen: + atrophy, no lesions  Urethra: caruncle, nontender  Bladder: some fullness, non tender  Vagina: +atrophy, no lesions, no ulcerations, no bleeding  Cervix: absent   Uterus: absent  Adnexa: no masses  Perineum: intact, non tender        PELVIC SUPPORT:  0 apex  2 cystocele  0 rectocele    Her pessary was removed, cleaned, and reinserted w/o difficulty    Impression/Plan:    ICD-10-CM    1. Cystocele, midline  N81.11       2. Post-menopausal atrophic vaginitis  N95.2       3. Pelvic muscle wasting  N81.84       4. Urge urinary incontinence  N39.41       5.  Nocturia  R35.1 Discussion Items:   Discussed mgmt of vulvovaginal atrophy with vaginal estrogen cream. Reviewed associated benefits, risks, alternatives, and goals. Recommend low dose twice weekly mgmt   Discussed management of pelvic organ prolapse including but not limited to behavioral modifications, conservative options, and surgical management. Discussed pessary management including benefits and risks. Discussed importance of keeping regularly scheduled pessary checks in prevention of complications related to pessary use. Continue pessary management, home care  Continue Gemtesa 75 mg daily for UUI  Continue vag estrogen twice weekly  Daily pelvic exercises  Bowel management reviewed  Call with s/sx of UTI, problems with pessary   All questions answered  Pt understands and agrees to plan       Return in about 6 months (around 6/19/2024) for pessary care, sooner prn.       Adrianne Power PA-C

## 2024-02-01 ENCOUNTER — TELEPHONE (OUTPATIENT)
Dept: UROLOGY | Facility: CLINIC | Age: 84
End: 2024-02-01

## 2024-02-01 RX ORDER — ESTRADIOL 0.1 MG/G
CREAM VAGINAL
Qty: 42.5 G | Refills: 3 | Status: SHIPPED | OUTPATIENT
Start: 2024-02-01

## 2024-02-01 NOTE — TELEPHONE ENCOUNTER
TC from pt today saying she is out of estrace cream. Gets from [facility]. Discussed using [facility] instead. Will e-scribe to pt [place].   Pt also c/o gemtesa is still not working. Would like to discuss options w/ NORA Garcia.   Discussed timed voids and bladder irritants.   Will schedule tele-visit end of Feb w/ Jade, since pt  is ill right now.

## 2024-02-29 ENCOUNTER — OFFICE VISIT (OUTPATIENT)
Dept: UROLOGY | Facility: CLINIC | Age: 84
End: 2024-02-29
Attending: OBSTETRICS & GYNECOLOGY
Payer: MEDICARE

## 2024-02-29 ENCOUNTER — TELEPHONE (OUTPATIENT)
Dept: UROLOGY | Facility: CLINIC | Age: 84
End: 2024-02-29

## 2024-02-29 VITALS
WEIGHT: 117 LBS | SYSTOLIC BLOOD PRESSURE: 108 MMHG | TEMPERATURE: 97 F | DIASTOLIC BLOOD PRESSURE: 58 MMHG | BODY MASS INDEX: 19 KG/M2

## 2024-02-29 DIAGNOSIS — N81.84 PELVIC MUSCLE WASTING: ICD-10-CM

## 2024-02-29 DIAGNOSIS — N39.41 URGE URINARY INCONTINENCE: Primary | ICD-10-CM

## 2024-02-29 DIAGNOSIS — N95.2 POST-MENOPAUSAL ATROPHIC VAGINITIS: ICD-10-CM

## 2024-02-29 DIAGNOSIS — N81.11 CYSTOCELE, MIDLINE: ICD-10-CM

## 2024-02-29 LAB
BLOOD URINE: NEGATIVE
CONTROL RUN WITHIN 24 HOURS?: YES
LEUKOCYTE ESTERASE URINE: NEGATIVE
NITRITE URINE: NEGATIVE

## 2024-02-29 PROCEDURE — 81002 URINALYSIS NONAUTO W/O SCOPE: CPT | Performed by: PHYSICIAN ASSISTANT

## 2024-02-29 PROCEDURE — 99212 OFFICE O/P EST SF 10 MIN: CPT | Performed by: PHYSICIAN ASSISTANT

## 2024-02-29 NOTE — PROGRESS NOTES
Pt presents to follow up bulge  Has #5 small knob incontinence dish pessary, home care  Reports pessary is comfortable   Previously fitted with #4 large knob incontinence dish - can insert but not remove herself  Liked #4 large knob pessary better   Denies discharge  Denies bleeding  Denies s/sx of UTI  Bowels regular   Pt is removing pessary at home every 2 weeks  Pt is using vaginal estrogen cream 3x week    Happy with pessary, feels supported  She is not currently interested in surgical management of her pelvic organ prolapse.    UUI: Was taking gemtesa 75 mg daily  She reports minimal improvement (1/10)  She stopped taking due to cost   UUI worse     Has tried in the past:  Myrbetriq 50 mg daily - not helpful  Trospium XR 60 mg daily - not helpful    Urogynecology Summary:  Urogynecology Summary  Prolapse: Yes  KEVIN: Yes  Urge Incontinence: Yes  Nocturia Frequency: 3  Frequency: 2 hours  Incomplete emptying: No  Constipation: No  Wears pad day?: 2 (1-2 light)  Wears Pad Night?: 1 (1 light)  Activities are limited by UI/POP?: Yes  Currently Sexually Active: No  Avoids sexual activity due to: Pain      Vitals:  Vitals:    02/29/24 1423   BP: 108/58   Temp: 97.4 °F (36.3 °C)       GENERAL EXAM:  GENERAL:  NAD  HEAD: NC/AT  EYES: symmetric bilaterally. No icterus. Sclera w/o injection  NECK: no masses  LUNGS:  Normal resp effort  ABDOMEN:  Soft, no mass  MUSK: normal gait, ROM wnl. No edema  PSYCH: A&Ox3. Recent and remote memory intact    PELVIC EXAM:  Ext. Gen: + atrophy, no lesions  Urethra: caruncle, nontender  Bladder: some fullness, non tender  Vagina: +atrophy, no lesions, no ulcerations, no bleeding  Cervix: absent   Uterus: absent  Adnexa: no masses  Perineum: intact, non tender        PELVIC SUPPORT:  0 apex  2 cystocele  0 rectocele    Her pessary was removed, cleaned, and given back to patient  #4 large knob incontinence dish pessary placed, dental floss attached  Patient able to demonstrate self removal  and insertion, pessary comfortable    Impression/Plan:    ICD-10-CM    1. Urge urinary incontinence  N39.41       2. Cystocele, midline  N81.11       3. Pelvic muscle wasting  N81.84       4. Post-menopausal atrophic vaginitis  N95.2             Discussion Items:   Discussed mgmt of vulvovaginal atrophy with vaginal estrogen cream. Reviewed associated benefits, risks, alternatives, and goals. Recommend low dose twice weekly mgmt   Discussed management of pelvic organ prolapse including but not limited to behavioral modifications, conservative options, and surgical management.   Discussed pessary management including benefits and risks. Discussed importance of keeping regularly scheduled pessary checks in prevention of complications related to pessary use.     Continue pessary management, home care  Continue vag estrogen twice weekly  Trial Trospium XR 60 mg daily for UUI  Daily pelvic exercises  Bowel management reviewed  Call with s/sx of UTI, problems with pessary   All questions answered  Pt understands and agrees to plan       Return in about 6 weeks (around 4/11/2024) for UUI, sooner prn, 1 year pessary follow up.      Jade Alamguer PA-C

## 2024-02-29 NOTE — TELEPHONE ENCOUNTER
When calling patient to check-in for her phone visit she asked to change appointment to in-person. Appointment was changed.

## 2024-04-16 ENCOUNTER — VIRTUAL PHONE E/M (OUTPATIENT)
Dept: UROLOGY | Facility: CLINIC | Age: 84
End: 2024-04-16
Attending: OBSTETRICS & GYNECOLOGY
Payer: MEDICARE

## 2024-04-16 DIAGNOSIS — N39.41 URGE URINARY INCONTINENCE: Primary | ICD-10-CM

## 2024-04-16 DIAGNOSIS — N81.84 PELVIC MUSCLE WASTING: ICD-10-CM

## 2024-04-16 DIAGNOSIS — N81.11 CYSTOCELE, MIDLINE: ICD-10-CM

## 2024-04-16 DIAGNOSIS — N95.2 POST-MENOPAUSAL ATROPHIC VAGINITIS: ICD-10-CM

## 2024-04-16 DIAGNOSIS — R35.1 NOCTURIA: ICD-10-CM

## 2024-04-16 RX ORDER — TROSPIUM CHLORIDE ER 60 MG/1
60 CAPSULE ORAL DAILY
Qty: 30 CAPSULE | Refills: 11 | Status: SHIPPED | OUTPATIENT
Start: 2024-04-16

## 2024-04-16 NOTE — PROGRESS NOTES
Patient to follow up UUI  Given circumstances surrounding COVID-19 this visit is being conducted as a televisit with pt's consent.  Pt in safe, private location prior to beginning visit. Provider located in office setting.    Pt is using vaginal estrogen cream 3x week  Denies any s/sx of UTI   Did not get prescription for Trospium as planned last visit  Prefers Gemtesa but not affordable     Has tried in the past:  Myrbetriq 50 mg daily - not helpful  Trospium XR 60 mg daily - not helpful  Gemtesa 75 mg daily - minimal improvement, 1/10 and expensive    Has #5 small knob incontinence dish pessary, home care    Urogynecology Summary:       There were no vitals taken for this visit.      Impression/Plan:    ICD-10-CM    1. Urge urinary incontinence  N39.41       2. Nocturia  R35.1       3. Post-menopausal atrophic vaginitis  N95.2       4. Pelvic muscle wasting  N81.84       5. Cystocele, midline  N81.11           Discussion Items:   Discussed dietary and behavioral modifications for mgmt of urinary symptoms  Discussed weight management and benefits of weight loss on urinary symptoms  Reviewed AUA/SUFU guidelines on mgmt of non-neurogenic OAB  Discussed pharmacologic and nonpharmacologic mgmt options of urinary symptoms - reviewed risks, benefits, alternatives, and goals of treatment  Discussed specific risks related to OAB meds including, but not limited to dry mouth, constipation, blurry vision, cognitive changes, and BP elevation.    Treatment Plan, Non-surgical:   Recommend Trospium XR 60 mg daily for UUI  Patient prefers Gemtesa but not affordable, recommend to reach out to    Continue vaginal estrogen cream  Continue pessary  Bladder diet/drill  Bowel regimen reviewed  Call with s/sx of UTI  All questions answered  She understands and agrees to plan    Keep previously scheduled appointment for pessary care in June, sooner lj Almaguer PA-C

## 2024-06-20 ENCOUNTER — VIRTUAL PHONE E/M (OUTPATIENT)
Dept: UROLOGY | Facility: CLINIC | Age: 84
End: 2024-06-20
Attending: OBSTETRICS & GYNECOLOGY

## 2024-06-20 DIAGNOSIS — N39.3 FEMALE STRESS INCONTINENCE: ICD-10-CM

## 2024-06-20 DIAGNOSIS — N81.84 PELVIC MUSCLE WASTING: ICD-10-CM

## 2024-06-20 DIAGNOSIS — N81.11 CYSTOCELE, MIDLINE: ICD-10-CM

## 2024-06-20 DIAGNOSIS — N95.2 POST-MENOPAUSAL ATROPHIC VAGINITIS: ICD-10-CM

## 2024-06-20 DIAGNOSIS — N39.41 URGE URINARY INCONTINENCE: Primary | ICD-10-CM

## 2024-06-20 NOTE — PROGRESS NOTES
Patient to follow up UUI  Given circumstances surrounding COVID-19 this visit is being conducted as a televisit with pt's consent.  Pt in safe, private location prior to beginning visit. Provider located in office setting.    Pt is using vaginal estrogen cream 3x week  Denies any s/sx of UTI   Stopped Myrbetriq due to severe constipation      Has tried in the past:  Myrbetriq 50 mg daily - not helpful  Trospium XR 60 mg daily - not helpful  Gemtesa 75 mg daily - minimal improvement, 1/10 and expensive     Has #5 small knob incontinence dish pessary, home care  Not as supportive as the #4 large knob incontinence dish, but unable to remove #4 large knob pessary and doesn't like string   Wants to be sexually active   Has previously used #3 ring with support - descended down  Reports KEVIN and UUI     Urogynecology Summary:       There were no vitals taken for this visit.    Impression/Plan:    ICD-10-CM    1. Urge urinary incontinence  N39.41       2. Post-menopausal atrophic vaginitis  N95.2       3. Pelvic muscle wasting  N81.84       4. Cystocele, midline  N81.11       5. Female stress incontinence  N39.3           Discussion Items:   Discussed mgmt of vulvovaginal atrophy with vaginal estrogen cream. Reviewed associated benefits, risks, alternatives, and goals. Recommend low dose twice weekly mgmt     Discussed dietary and behavioral modifications for mgmt of urinary symptoms  Discussed weight management and benefits of weight loss on urinary symptoms  Reviewed AUA/SUFU guidelines on mgmt of non-neurogenic OAB  Discussed pharmacologic and nonpharmacologic mgmt options of urinary symptoms - reviewed risks, benefits, alternatives, and goals of treatment  Discussed specific risks related to OAB meds including, but not limited to dry mouth, constipation, blurry vision, cognitive changes, and BP elevation.      Treatment Plan, Non-surgical:   Does not want UUI medications at this time  Continue vaginal estrogen cream  three times weekly  Bladder diet/drill  Bowel regimen reviewed  Call with s/sx of UTI  All questions answered  She understands and agrees to plan    Return in about 2 months (around 8/20/2024) for pessary, KEVIN, UUI, sooner prn .    Jade Almaguer PA-C    The 21st Century Cures Act makes medical notes like these available to patients in the interest of transparency. However, be advised this is a medical document. It is intended as peer to peer communication. It is written in medical language and may contain abbreviations or verbiage that are unfamiliar. It may appear blunt or direct. Medical documents are intended to carry relevant information, facts as evident, and the clinical opinion of the practitioner.

## 2024-08-22 ENCOUNTER — OFFICE VISIT (OUTPATIENT)
Dept: UROLOGY | Facility: CLINIC | Age: 84
End: 2024-08-22
Attending: OBSTETRICS & GYNECOLOGY
Payer: MEDICARE

## 2024-08-22 VITALS — RESPIRATION RATE: 18 BRPM | TEMPERATURE: 98 F | HEIGHT: 65 IN | BODY MASS INDEX: 19 KG/M2

## 2024-08-22 DIAGNOSIS — N39.3 FEMALE STRESS INCONTINENCE: ICD-10-CM

## 2024-08-22 DIAGNOSIS — N81.11 CYSTOCELE, MIDLINE: Primary | ICD-10-CM

## 2024-08-22 DIAGNOSIS — N95.2 POST-MENOPAUSAL ATROPHIC VAGINITIS: ICD-10-CM

## 2024-08-22 DIAGNOSIS — N39.41 URGE URINARY INCONTINENCE: ICD-10-CM

## 2024-08-22 DIAGNOSIS — N81.84 PELVIC MUSCLE WASTING: ICD-10-CM

## 2024-08-22 PROCEDURE — 99212 OFFICE O/P EST SF 10 MIN: CPT

## 2024-08-22 NOTE — PROGRESS NOTES
Pt presents to follow up bulge  Doing well with #3 ring with support with knob pessary, home care  Reports pessary is comfortable   Denies discharge  Denies bleeding  Denies s/sx of UTI  Bowels regular  Pt is removing pessary at home every 2-3 weeks    Pt is using vaginal estrogen cream three times weekly  UUI bothersome, has failed myrbetriq, trospium and gemtesa  Had UDS in 2013 No DO, No KEVIN    Happy with pessary, feels supported  She is not currently interested in surgical management of her pelvic organ prolapse.    Urogynecology Summary:  Urogynecology Summary  Prolapse: Yes  KEVIN: Yes  Urge Incontinence: Yes (more bothersome)  Nocturia Frequency: 3  Frequency: 2 hours  Incomplete emptying: No  Constipation: No  Wears pad day?: 1 (heavy)  Wears Pad Night?:  (heavy)  Activities are limited by UI/POP?: No  Currently Sexually Active: Yes      Vitals:  Vitals:    08/22/24 1409   Resp: 18   Temp: 98 °F (36.7 °C)       GENERAL EXAM:  GENERAL:  NAD  HEAD: NC/AT  EYES: symmetric bilaterally. No icterus. Sclera w/o injection  NECK: no masses  LUNGS:  Normal resp effort  ABDOMEN:  Soft, no mass  MUSK: normal gait, ROM wnl. No edema  PSYCH: A&Ox3. Recent and remote memory intact    PELVIC EXAM: ELIAN Moore, present for exam as a chaperone  Ext. Gen: + atrophy, no lesions  Urethra: caruncle, nontender  Bladder: some fullness, non tender  Vagina: +atrophy, no lesions, no ulcerations, no bleeding  Cervix: absent   Uterus: absent  Adnexa: no masses  Perineum: intact, non tender        PELVIC SUPPORT:  0 apex  2 cystocele  0 rectocele    Her pessary was removed, cleaned, and reinserted w/o difficulty    Impression/Plan:    ICD-10-CM    1. Cystocele, midline  N81.11       2. Pelvic muscle wasting  N81.84       3. Post-menopausal atrophic vaginitis  N95.2       4. Urge urinary incontinence  N39.41       5. Female stress incontinence  N39.3             Discussion Items:   Discussed mgmt of vulvovaginal atrophy with vaginal estrogen  cream. Reviewed associated benefits, risks, alternatives, and goals. Recommend low dose twice weekly mgmt   Discussed management of pelvic organ prolapse including but not limited to behavioral modifications, conservative options, and surgical management.   Discussed pessary management including benefits and risks. Discussed importance of keeping regularly scheduled pessary checks in prevention of complications related to pessary use.   Discussed dietary and behavioral modifications for mgmt of urinary symptoms  Discussed weight management and benefits of weight loss on urinary symptoms  Reviewed AUA/SUFU guidelines on mgmt of non-neurogenic OAB  Discussed pharmacologic and nonpharmacologic mgmt options of urinary symptoms - reviewed risks, benefits, alternatives, and goals of treatment  Discussed specific risks related to OAB meds including, but not limited to dry mouth, constipation, blurry vision, cognitive changes, and BP elevation.      Continue pessary management, home care  UUI - failed medications, interested in botox, given [facility] information, discussed risk of incomplete bladder emptying with botox, given AUGs information on botox and PTNS  Consider repeat UDS  Continue vag estrogen twice weekly  Daily pelvic exercises  Bowel management reviewed  Call with s/sx of UTI, problems with pessary   All questions answered  Pt understands and agrees to plan       Return in about 6 months (around 2/22/2025) for pessary care, sooner prn .      Jade Almaguer PA-C      The 21st Century Cures Act makes medical notes like these available to patients in the interest of transparency. However, be advised this is a medical document. It is intended as peer to peer communication. It is written in medical language and may contain abbreviations or verbiage that are unfamiliar. It may appear blunt or direct. Medical documents are intended to carry relevant information, facts as evident, and the clinical opinion of the  practitioner.

## 2024-09-26 ENCOUNTER — TELEPHONE (OUTPATIENT)
Dept: UROLOGY | Facility: CLINIC | Age: 84
End: 2024-09-26

## 2024-09-26 DIAGNOSIS — R35.0 FREQUENCY OF URINATION: Primary | ICD-10-CM

## 2024-09-26 NOTE — TELEPHONE ENCOUNTER
TC to patient   Having worsening UUI symptoms  Recommend ucx, order placed for [facility]  Needs repeat UDS and UDS follow up with Dr Pelayo  Patient verbalized understanding and had no further questions

## 2024-10-07 ENCOUNTER — TELEPHONE (OUTPATIENT)
Dept: UROLOGY | Facility: CLINIC | Age: 84
End: 2024-10-07

## 2024-10-09 ENCOUNTER — OFFICE VISIT (OUTPATIENT)
Dept: UROLOGY | Facility: CLINIC | Age: 84
End: 2024-10-09
Attending: OBSTETRICS & GYNECOLOGY
Payer: MEDICARE

## 2024-10-09 VITALS
DIASTOLIC BLOOD PRESSURE: 68 MMHG | SYSTOLIC BLOOD PRESSURE: 140 MMHG | BODY MASS INDEX: 19.49 KG/M2 | HEIGHT: 65 IN | WEIGHT: 117 LBS | RESPIRATION RATE: 16 BRPM

## 2024-10-09 DIAGNOSIS — N81.11 CYSTOCELE, MIDLINE: Primary | ICD-10-CM

## 2024-10-09 DIAGNOSIS — N39.41 URGE URINARY INCONTINENCE: ICD-10-CM

## 2024-10-09 LAB
CONTROL RUN WITHIN 24 HOURS?: YES
LEUKOCYTE ESTERASE URINE: NEGATIVE
NITRITE URINE: NEGATIVE

## 2024-10-09 PROCEDURE — 81002 URINALYSIS NONAUTO W/O SCOPE: CPT

## 2024-10-09 PROCEDURE — 51797 INTRAABDOMINAL PRESSURE TEST: CPT

## 2024-10-09 PROCEDURE — 51728 CYSTOMETROGRAM W/VP: CPT

## 2024-10-09 PROCEDURE — 51784 ANAL/URINARY MUSCLE STUDY: CPT

## 2024-10-09 NOTE — PROCEDURES
Patient here for urodynamic testing.  Procedure explained and confirmed by patient.  See evaluation form for results.  Both verbal and written discharge instructions were given.  Patient tolerated procedure well and will follow up with Dr. Kristofer Pelayo on 10/25/24.    URODYNAMIC EVALUATION    PATIENT HISTORY:    Prolapse:  Yes - self care pessary # 3 ring with support / knob - feels this is supportive -   KEVIN:  Yes  UUI:  Yes most bothersome  Nocturia:  4 + - reports she was up every hour last night - however she had removed pessary - does feel nocturia will be 3 / per night with device in  Frequency:  less than 1 hour  Sense of Incomplete Emptying:  No  Constipation:  No  Last void prior to UDS testing:  <60 minutes  Current urge to void?  Moderate  OAB meds stopped prior to test?  NA  Other symptoms?  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  []  No  [x]  Interested in surgery:   []  Yes, specify date:    Surgical folder provided?  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  []  Yes  [x]  No  []  Pessary  []  Manual  []  Half Speculum    Additional Notes: Patient removed device prior to  arrival    UROFLOWMETRY:  Unreduced  Voided Volume:   Unable to void - voided 10  ml in BR  Maximum Flow Rate:                         N/a       mL/sec  Average flow rate:                        N/a     mL/sec  Post-void Residual:                       20    mL  Pattern:  []  Normal  []  Poor flow     []  Intermittent  []  Other  Void:   []  Typical  [x]  Atypical    Additional Notes:     CYSTOMETRY:  Urethral Catheter:  Fr 7 / tdoc  Abd Catheter:     Fr 7 / tdoc   Infusion:  Water Rate  20-30 mL/min  Temp:  Room  Position:  [x]  Sit  []  Stand  []  Supine  First sensation:   62 mL  First desire to void:   75 mL  Strong desire to void:  109 mL  Maximum cystometric capacity:   133 mL  Detrusor Activity:  [x]  Unstable   []  Stable  Urge leakage?    [x]  Yes []  No  Volume at 1st unhibited detrusor cont:   130 mL  Detrusor instability provoked by:    []  Spontaneous [x]  Coughing  [x]  Filling  []  Valsalva  []  Other    Additional Notes:   Initial DO at 130 - initiated with cough  - repeat fills demonstrated DO , urge leak with inability to stop the leaking     URETHRAL FUNCTION:  Valsava (vesical) Leak Point Pressures:    Volume Leak Point Pressure Leak?    Cough Valsalva      130 mL 55     cm H2O [x]  Yes []  No         Genuine Stress Incontinence demonstrated?   []  Yes  [x]  No Leak with cough at 130 ml- initiating DO, large urge leak    Resting Urethral Pressure Profile:  did not obtain     Functional Urethral Length:          cm                 cm                 cm  Maximum UCP:           cm             cm                  cm    PRESSURE/FLOW STUDY:  Unreduced  Voided volume:       118 mL  Maximum flow rate:      6  mL/sec  Pressure Detrusor (at maximum flow):           67 cm H2O  Post void residual:         20      mL  Voiding mechanism:  []  Abnormal  [x]  Normal  []  Strain to void   []  Weak detrusor  Void:   [x]  Typical   []  Atypical    Additional Notes: Replaced patient's own device at end of UDS - # 3  ring with support - large knob   Uroflowmetry, cystometry, and pressure / flow study were completed using calibrated electronic equipment and air charged transducers.    EMG:  During fill: Reactive    During flow study: Reactive    10/9/2024 12:31 PM     PERFORMED BY:  Mariana SANTIZO RN      URODYNAMIC PHYSICIAN INTERPRETATION    IMPRESSION:     Post-Procedure Diagnoses: Detrusor instability [N31.9] and Urinary frequency [R35.0]    Comments:      Kristofer Pelayo MD   10/11/2024   12:56 PM             [1]   Outpatient Medications Marked as Taking for the 10/9/24 encounter (Office Visit) with LIS PROCEDURE   Medication Sig Dispense Refill    estradiol (ESTRACE) 0.1 MG/GM Vaginal Cream Apply 1 gram vaginally 3 times per week. 42.5 g 3    ALPRAZOLAM 0.5 MG Oral Tab TAKE 1/2 TABLET BY MOUTH NIGHTLY AS NEEDED FOR ANXIETY  45 tablet 0    hydrochlorothiazide 12.5 MG Oral Cap Take 1 capsule (12.5 mg total) by mouth daily. 90 capsule 3    amLODIPine Besylate 10 MG Oral Tab Take 1 tablet (10 mg total) by mouth daily. 90 tablet 3    Sertraline HCl 100 MG Oral Tab Take 1 tablet (100 mg total) by mouth daily. 90 tablet 3    carvedilol 25 MG Oral Tab Take 1 tablet (25 mg total) by mouth 2 (two) times daily with meals. (Patient taking differently: Take 6.5 mg by mouth 2 (two) times daily with meals.) 180 tablet 3    Pravastatin Sodium 20 MG Oral Tab Take 1 tablet (20 mg total) by mouth nightly. 90 tablet 3    Cyanocobalamin (VITAMIN B 12) 100 MCG Oral Lozenge Take 1,000 mcg by mouth.      VITAMIN D 2000 UNIT OR TABS 1 tablet daily      POTASSIUM GLUCONATE 550 MG OR TABS Take by mouth.

## 2024-10-09 NOTE — PATIENT INSTRUCTIONS
[facility] UROGYNECOLOGY  [address] [address]  PH: [phone]  FAX: [phone]       Urodynamic Testing Discharge Instructions:    There are NO dietary or activity restrictions.  You may resume your normal schedule.      You may have mild discomfort for a few hours after your testing today.  There may be some mild burning when you urinate or you may see some blood in your urine.  These problems should not last more than 24 hours.  The following suggestions may minimize any symptoms you experience.    Drink 6-8 large glasses of water over the next 8 hours  A compress or sitz bath may be soothing  Tylenol or Ibuprofen may be taken as needed    If you experience any of the following, please call the office or, if after hours, the on-call physician at [phone].    Excessive pain  Bright red bloody discharge  Fever or chills  Continued urgency, frequency or burning with urination    Obtaining Test Results    Your urodynamic test will be interpreted by a specialist and available to the referring physician within 7-14 days.  Patients in our clinic are given an appointment to come back to discuss the results and any appropriate treatment recommendations.    Please do not hesitate to contact our office with any questions or concerns at [phone].    I acknowledge that I have received verbal and written discharge  instructions and that I understand these instructions clearly.    Patient Signature:    Date:

## 2024-10-25 ENCOUNTER — OFFICE VISIT (OUTPATIENT)
Dept: UROLOGY | Facility: CLINIC | Age: 84
End: 2024-10-25
Attending: OBSTETRICS & GYNECOLOGY
Payer: MEDICARE

## 2024-10-25 VITALS
HEIGHT: 65 IN | TEMPERATURE: 98 F | RESPIRATION RATE: 17 BRPM | DIASTOLIC BLOOD PRESSURE: 75 MMHG | BODY MASS INDEX: 19 KG/M2 | SYSTOLIC BLOOD PRESSURE: 130 MMHG

## 2024-10-25 DIAGNOSIS — N39.41 URGE URINARY INCONTINENCE: ICD-10-CM

## 2024-10-25 DIAGNOSIS — N81.11 CYSTOCELE, MIDLINE: Primary | ICD-10-CM

## 2024-10-25 DIAGNOSIS — N95.2 POST-MENOPAUSAL ATROPHIC VAGINITIS: ICD-10-CM

## 2024-10-25 PROCEDURE — 99212 OFFICE O/P EST SF 10 MIN: CPT

## 2024-10-25 RX ORDER — LOSARTAN POTASSIUM 25 MG/1
25 TABLET ORAL DAILY
COMMUNITY

## 2024-11-19 ENCOUNTER — ANESTHESIA (OUTPATIENT)
Dept: SURGERY | Facility: HOSPITAL | Age: 84
End: 2024-11-19
Payer: MEDICARE

## 2024-11-19 ENCOUNTER — HOSPITAL ENCOUNTER (OUTPATIENT)
Facility: HOSPITAL | Age: 84
Setting detail: HOSPITAL OUTPATIENT SURGERY
Discharge: HOME | End: 2024-11-19
Attending: OBSTETRICS & GYNECOLOGY | Admitting: OBSTETRICS & GYNECOLOGY
Payer: MEDICARE

## 2024-11-19 ENCOUNTER — ANESTHESIA EVENT (OUTPATIENT)
Dept: SURGERY | Facility: HOSPITAL | Age: 84
End: 2024-11-19
Payer: MEDICARE

## 2024-11-19 VITALS
HEART RATE: 61 BPM | SYSTOLIC BLOOD PRESSURE: 144 MMHG | TEMPERATURE: 98 F | HEIGHT: 65 IN | DIASTOLIC BLOOD PRESSURE: 56 MMHG | WEIGHT: 116 LBS | BODY MASS INDEX: 19.33 KG/M2 | OXYGEN SATURATION: 96 % | RESPIRATION RATE: 16 BRPM

## 2024-11-19 PROCEDURE — 3E0K8GC INTRODUCTION OF OTHER THERAPEUTIC SUBSTANCE INTO GENITOURINARY TRACT, VIA NATURAL OR ARTIFICIAL OPENING ENDOSCOPIC: ICD-10-PCS | Performed by: OBSTETRICS & GYNECOLOGY

## 2024-11-19 RX ORDER — LABETALOL HYDROCHLORIDE 5 MG/ML
5 INJECTION, SOLUTION INTRAVENOUS EVERY 5 MIN PRN
Status: DISCONTINUED | OUTPATIENT
Start: 2024-11-19 | End: 2024-11-19

## 2024-11-19 RX ORDER — NALOXONE HYDROCHLORIDE 0.4 MG/ML
80 INJECTION, SOLUTION INTRAMUSCULAR; INTRAVENOUS; SUBCUTANEOUS AS NEEDED
Status: DISCONTINUED | OUTPATIENT
Start: 2024-11-19 | End: 2024-11-19

## 2024-11-19 RX ORDER — GABAPENTIN 100 MG/1
100 CAPSULE ORAL NIGHTLY
COMMUNITY
Start: 2024-10-07

## 2024-11-19 RX ORDER — HYDROMORPHONE HYDROCHLORIDE 1 MG/ML
0.6 INJECTION, SOLUTION INTRAMUSCULAR; INTRAVENOUS; SUBCUTANEOUS EVERY 5 MIN PRN
Status: DISCONTINUED | OUTPATIENT
Start: 2024-11-19 | End: 2024-11-19

## 2024-11-19 RX ORDER — PHENYLEPHRINE HCL 10 MG/ML
VIAL (ML) INJECTION AS NEEDED
Status: DISCONTINUED | OUTPATIENT
Start: 2024-11-19 | End: 2024-11-19 | Stop reason: SURG

## 2024-11-19 RX ORDER — SODIUM CHLORIDE, SODIUM LACTATE, POTASSIUM CHLORIDE, CALCIUM CHLORIDE 600; 310; 30; 20 MG/100ML; MG/100ML; MG/100ML; MG/100ML
INJECTION, SOLUTION INTRAVENOUS CONTINUOUS
Status: DISCONTINUED | OUTPATIENT
Start: 2024-11-19 | End: 2024-11-19

## 2024-11-19 RX ORDER — DEXAMETHASONE SODIUM PHOSPHATE 4 MG/ML
VIAL (ML) INJECTION AS NEEDED
Status: DISCONTINUED | OUTPATIENT
Start: 2024-11-19 | End: 2024-11-19 | Stop reason: SURG

## 2024-11-19 RX ORDER — ACETAMINOPHEN 500 MG
1000 TABLET ORAL ONCE
Status: DISCONTINUED | OUTPATIENT
Start: 2024-11-19 | End: 2024-11-19 | Stop reason: HOSPADM

## 2024-11-19 RX ORDER — METOCLOPRAMIDE HYDROCHLORIDE 5 MG/ML
10 INJECTION INTRAMUSCULAR; INTRAVENOUS EVERY 8 HOURS PRN
Status: DISCONTINUED | OUTPATIENT
Start: 2024-11-19 | End: 2024-11-19

## 2024-11-19 RX ORDER — HYDROCODONE BITARTRATE AND ACETAMINOPHEN 5; 325 MG/1; MG/1
2 TABLET ORAL ONCE AS NEEDED
Status: DISCONTINUED | OUTPATIENT
Start: 2024-11-19 | End: 2024-11-19

## 2024-11-19 RX ORDER — ACETAMINOPHEN 500 MG
1000 TABLET ORAL ONCE AS NEEDED
Status: DISCONTINUED | OUTPATIENT
Start: 2024-11-19 | End: 2024-11-19

## 2024-11-19 RX ORDER — HYDROCODONE BITARTRATE AND ACETAMINOPHEN 5; 325 MG/1; MG/1
1 TABLET ORAL ONCE AS NEEDED
Status: DISCONTINUED | OUTPATIENT
Start: 2024-11-19 | End: 2024-11-19

## 2024-11-19 RX ORDER — HYDROMORPHONE HYDROCHLORIDE 1 MG/ML
0.2 INJECTION, SOLUTION INTRAMUSCULAR; INTRAVENOUS; SUBCUTANEOUS EVERY 5 MIN PRN
Status: DISCONTINUED | OUTPATIENT
Start: 2024-11-19 | End: 2024-11-19

## 2024-11-19 RX ORDER — ONDANSETRON 2 MG/ML
INJECTION INTRAMUSCULAR; INTRAVENOUS AS NEEDED
Status: DISCONTINUED | OUTPATIENT
Start: 2024-11-19 | End: 2024-11-19 | Stop reason: SURG

## 2024-11-19 RX ORDER — LIDOCAINE HYDROCHLORIDE 10 MG/ML
INJECTION, SOLUTION EPIDURAL; INFILTRATION; INTRACAUDAL; PERINEURAL AS NEEDED
Status: DISCONTINUED | OUTPATIENT
Start: 2024-11-19 | End: 2024-11-19 | Stop reason: SURG

## 2024-11-19 RX ORDER — LIDOCAINE HYDROCHLORIDE 20 MG/ML
JELLY TOPICAL AS NEEDED
Status: DISCONTINUED | OUTPATIENT
Start: 2024-11-19 | End: 2024-11-19 | Stop reason: HOSPADM

## 2024-11-19 RX ORDER — ONDANSETRON 2 MG/ML
4 INJECTION INTRAMUSCULAR; INTRAVENOUS EVERY 6 HOURS PRN
Status: DISCONTINUED | OUTPATIENT
Start: 2024-11-19 | End: 2024-11-19

## 2024-11-19 RX ORDER — HYDROMORPHONE HYDROCHLORIDE 1 MG/ML
0.4 INJECTION, SOLUTION INTRAMUSCULAR; INTRAVENOUS; SUBCUTANEOUS EVERY 5 MIN PRN
Status: DISCONTINUED | OUTPATIENT
Start: 2024-11-19 | End: 2024-11-19

## 2024-11-19 RX ADMIN — ONDANSETRON 4 MG: 2 INJECTION INTRAMUSCULAR; INTRAVENOUS at 15:40:00

## 2024-11-19 RX ADMIN — DEXAMETHASONE SODIUM PHOSPHATE 4 MG: 4 MG/ML VIAL (ML) INJECTION at 15:40:00

## 2024-11-19 RX ADMIN — SODIUM CHLORIDE, SODIUM LACTATE, POTASSIUM CHLORIDE, CALCIUM CHLORIDE: 600; 310; 30; 20 INJECTION, SOLUTION INTRAVENOUS at 15:25:00

## 2024-11-19 RX ADMIN — LIDOCAINE HYDROCHLORIDE 50 MG: 10 INJECTION, SOLUTION EPIDURAL; INFILTRATION; INTRACAUDAL; PERINEURAL at 15:33:00

## 2024-11-19 RX ADMIN — PHENYLEPHRINE HCL 100 MCG: 10 MG/ML VIAL (ML) INJECTION at 15:33:00

## 2024-11-19 NOTE — OPERATIVE REPORT
Pre-Operative Diagnosis:  Overactive bladder    Post-Operative Diagnosis: Same.    Procedure Performed:  Cystoscopy with intradetrusor injection of Botox (100 units)    Surgeon:  Kristofer Pelayo MD    Anesthesia: General    EBL: None    Complications: None    Specimen:  None    The patient was taken to the operating room, induced under general anesthesia, then placed in the dorsal lithotomy position and prepped and draped in the usual sterile fashion.  The urethra was dilated to accommodate a 23 Serbian cystoscope sheath and cystoscopy was performed.  There were no intravesical lesions noted.  100 units of botox diluted in 10 ml of injectable saline was then used to inject 1.0 ml per injection across the posterior wall of the bladder.  The bladder was emptied and cystoscopy was then repeated, confirming there were no areas of bleeding from the injections.  The bladder was again emptied and the patient was then removed from the dorsal lithotomy position, awakened, extubated and transferred from the operating room to the recovery room in stable condition, having tolerated the procedure well

## 2024-11-19 NOTE — ANESTHESIA POSTPROCEDURE EVALUATION
[facility]    Faith Wu [facility] Patient Status:  Hospital Outpatient Surgery   Age/Gender 83 year old female MRN [id number]   Location [facility] POST ANESTHESIA CARE UNIT Attending Kristofer Pelayo MD   Hosp Day # 0 PCP Kimmy Conn MD       Anesthesia Post-op Note    Cystoscopy with intradetrusor injection of Botox (100 units)    Procedure Summary       Date: 11/19/24 Room / Location:  MAIN OR 01 / [facility] MAIN OR    Anesthesia Start: 1525 Anesthesia Stop: 1617    Procedure: Cystoscopy with intradetrusor injection of Botox (100 units) (Bladder) Diagnosis: (over active bladder)    Surgeons: Kristofer Pelayo MD Anesthesiologist: Miguel Banegas MD    Anesthesia Type: general ASA Status: 2            Anesthesia Type: general    Vitals Value Taken Time   /56 11/19/24 1618   Temp 98.4 °F (36.9 °C) 11/19/24 1618   Pulse 74 11/19/24 1618   Resp 16 11/19/24 1618   SpO2 97 % 11/19/24 1618   Vitals shown include unfiled device data.    Patient Location: PACU    Anesthesia Type: general    Airway Patency: patent and extubated    Postop Pain Control: adequate    Mental Status: mildly sedated but able to meaningfully participate in the post-anesthesia evaluation    Nausea/Vomiting: none    Cardiopulmonary/Hydration status: stable euvolemic    Complications: no apparent anesthesia related complications    Postop vital signs: stable    Dental Exam: Unchanged from Preop    Patient to be discharged from PACU when criteria met.

## 2024-11-19 NOTE — ANESTHESIA PREPROCEDURE EVALUATION
PRE-OP EVALUATION    Patient Name: Faith Gordon    Admit Diagnosis: over active bladder    Pre-op Diagnosis: over active bladder    CYSTOSCOPY WITH BOTOX INJECTION    Anesthesia Procedure: CYSTOSCOPY WITH BOTOX INJECTION    Surgeons and Role:     * Kristofer Pelayo MD - Primary    Pre-op vitals reviewed.  Temp: 97.6 °F (36.4 °C)  Pulse: 50  Resp: 18  BP: 175/60  SpO2: 98 %  Body mass index is 19.3 kg/m².    Current medications reviewed.  Hospital Medications:   [Transfer Hold] acetaminophen (Tylenol Extra Strength) tab 1,000 mg  1,000 mg Oral Once    lactated ringers infusion   Intravenous Continuous    ceFAZolin (Ancef) 2g in 10mL IV syringe premix  2 g Intravenous Once       Outpatient Medications:   Prescriptions Prior to Admission[1]    Allergies: Sulfa antibiotics      Anesthesia Evaluation    Patient summary reviewed.    Anesthetic Complications  (-) history of anesthetic complications         GI/Hepatic/Renal      (+) GERD                           Cardiovascular        Exercise tolerance: good     MET: >4      (+) hypertension                                     Endo/Other           (+) hypothyroidism                       Pulmonary    Negative pulmonary ROS.                       Neuro/Psych      (+) depression                                Past Surgical History:   Procedure Laterality Date    Cataract      Colonoscopy  01/01/2000    normal    Colonoscopy  2011/2012?    Hemorrhoidectomy      1999    Hysterectomy  01/01/1982    RUFINA for endometriosis, ovaries intact    Lumpectomy left  10/27/2015    LCIS    Mastectomy right  01/01/1988    with implant, then tram    Other surgical history  01/01/1962    acute glomerular nephritis    Other surgical history  01/01/1972    sacroiliac join fusion    Other surgical history  01/01/1990    LOBECTOMY LEFT UPPER LOBE    Other surgical history  01/01/1996    TRAM BREAST RECONSTRUCTION    Other surgical history  01/01/1996    FX LEFT ELBOW 2 SURGERIES 2 PLATES AND  12 SCREWS STILL IN ELBOW    Other surgical history  01/01/1997    SURGERY ON L5 &6    Other surgical history  01/01/2001    BI LATERAL CATARACT REMOVAL AND LENS IMPLANTS    Other surgical history  01/01/2013    nodule removed by parotid gland-benign    Surgery - external  01/01/2014    with dr Pelayo    Surgery - external  10/01/2013    rectocele repair    Tonsillectomy      x2 once as a child and then as a adult      Social History     Socioeconomic History    Marital status:    Tobacco Use    Smoking status: Never    Smokeless tobacco: Never   Vaping Use    Vaping status: Never Used   Substance and Sexual Activity    Alcohol use: Yes     Alcohol/week: 0.0 standard drinks of alcohol     Comment: 1-2 wine/week    Drug use: No    Sexual activity: Not Currently   Other Topics Concern    Caffeine Concern Yes    Exercise Yes     History   Drug Use No     Available pre-op labs reviewed.               Airway      Mallampati: II  Mouth opening: >3 FB  TM distance: > 6 cm  Neck ROM: full Cardiovascular    Cardiovascular exam normal.  Rhythm: regular  Rate: normal     Dental    Dentition appears grossly intact         Pulmonary    Pulmonary exam normal.  Breath sounds clear to auscultation bilaterally.               Other findings              ASA: 2   Plan: general  NPO status verified and patient meets guidelines.  Patient has not taken beta blockers in last 24 hours.  Post-procedure pain management plan discussed with surgeon and patient.    Comment: I spoke with the patient and discussed the risks of general anesthesia, which include nausea and vomiting; sore throat; injury to the lips, gums, teeth, and eyes; cardiac, pulmonary, and neurologic events; aspiration; and allergic reactions. The patient understands these risks and consents to receiving general anesthesia for this procedure.    I had lengthy conversation with the patient and her daughter (POA) about her concerns regarding extensive resuscitation efforts  in the unlikely event of a cardiac arrest. I reassured the patient that all efforts would be to reverse a correctable cause, and the patient and daughter agreed to proceed with this procedure while having her DNR status rescinded during the OR and recovery phases. All questions were answered regarding this topic.  Plan/risks discussed with: patient and child/children  Use of blood product(s) discussed with: patient and child/children    Consented to blood products.          Present on Admission:  **None**             [1]   Medications Prior to Admission   Medication Sig Dispense Refill Last Dose/Taking    gabapentin 100 MG Oral Cap Take 1 capsule (100 mg total) by mouth nightly.   11/18/2024 Bedtime    losartan 25 MG Oral Tab Take 1 tablet (25 mg total) by mouth daily.   11/18/2024    estradiol (ESTRACE) 0.1 MG/GM Vaginal Cream Apply 1 gram vaginally 3 times per week. 42.5 g 3 Taking    ALPRAZOLAM 0.5 MG Oral Tab TAKE 1/2 TABLET BY MOUTH NIGHTLY AS NEEDED FOR ANXIETY  45 tablet 0 11/18/2024    hydrochlorothiazide 12.5 MG Oral Cap Take 1 capsule (12.5 mg total) by mouth daily. 90 capsule 3 11/18/2024    amLODIPine Besylate 10 MG Oral Tab Take 1 tablet (10 mg total) by mouth daily. 90 tablet 3 11/19/2024 at  9:00 AM    Levothyroxine Sodium 75 MCG Oral Tab Take 1 tablet (75 mcg total) by mouth daily. 90 tablet 3 11/19/2024 at  9:00 AM    Sertraline HCl 100 MG Oral Tab Take 1 tablet (100 mg total) by mouth daily. (Patient taking differently: Take 1 tablet (100 mg total) by mouth daily as needed.) 90 tablet 3 Taking Differently    carvedilol 25 MG Oral Tab Take 1 tablet (25 mg total) by mouth 2 (two) times daily with meals. (Patient taking differently: Take 6.5 mg by mouth 2 (two) times daily with meals.) 180 tablet 3 11/19/2024 at  9:00 AM    Pravastatin Sodium 20 MG Oral Tab Take 1 tablet (20 mg total) by mouth nightly. 90 tablet 3 11/18/2024    Cyanocobalamin (VITAMIN B 12) 100 MCG Oral Lozenge Take 1,000 mcg by  mouth.   11/18/2024    VITAMIN D 2000 UNIT OR TABS 1 tablet daily   11/18/2024    POTASSIUM GLUCONATE 550 MG OR TABS Take by mouth as needed.   Taking As Needed    Trospium Chloride ER 60 MG Oral Capsule SR 24 Hr Take 1 capsule (60 mg total) by mouth daily. (Patient not taking: Reported on 8/22/2024) 30 capsule 11

## 2024-11-19 NOTE — ANESTHESIA PROCEDURE NOTES
Airway  Date/Time: 11/19/2024 3:34 PM  Urgency: elective      General Information and Staff    Patient location during procedure: OR  Anesthesiologist: Miguel Banegas MD  Performed: anesthesiologist   Performed by: Miguel Banegas MD  Authorized by: Miguel Banegas MD      Indications and Patient Condition  Indications for airway management: anesthesia  Sedation level: deep  Preoxygenated: yes  Patient position: sniffing  Mask difficulty assessment: 1 - vent by mask    Final Airway Details  Final airway type: supraglottic airway      Successful airway: classic  Size 4       Number of attempts at approach: 1  Number of other approaches attempted: 0

## 2024-11-19 NOTE — DISCHARGE INSTRUCTIONS
Pelvic Medicine Postoperative Instructions:    1. AVOID CONSTIPATION:   -Take Miralax one capful in water or juice each morning.    -Take Fiber supplement along with Miralax as well.  These can be mixed together in the same glass of liquid.  -On any day that you don't have a bowel movement, you can take 1-2 teaspoons of Milk of Magnesia that evening   2. No lifting over 15 pounds (1 gallon of milk is 8 pounds).  3. Showers and tub baths are okay.  4. You may ride in a car immediately.  5. You may drive after 1-2 days as long as you are not taking any narcotic pain medications    Please call us for any of the following:  -Temperature above 100.5 for 4 hours or above 101.0 at any time.  -Chest pain or trouble breathing.  -Vaginal bleeding heavier than a period.  -Redness, tenderness or swelling of your legs  -Pain or burning when you urinate; or if you go home with a catheter, trouble with catheter draining.  -Redness, pain or foul discharge from incision.    Office telephone: [phone]  After hours: [phone]

## 2024-11-19 NOTE — H&P
[facility]   part of [facility]    History & Physical    Fatih Gordon Patient Status:  Hospital Outpatient Surgery    1940 MRN [id number]   Location [facility] PRE OP HOLDING Attending Kristofer Pelayo MD   Hosp Day # 0 PCP Kimmy Conn MD     Date of Admission:  2024    History of Present Illness:  Faith Gordon is a(n) 83 year old female. Here for treatment of urgency, frequency, and overactive bladder with urge incontinence.    History:  Past Medical History:    ANXIETY    uses alprazolam 1-2x a week for sleep    Anxiety state    Cancer (HCC)    BREAST BILATERAL    Depression    GERD (gastroesophageal reflux disease)    hoarseness, Dr Belle diagnosed    H/O abdominal hysterectomy    High blood pressure    High cholesterol    Hypothyroidism    Multiple thyroid nodules    seeing Dr Avila    Other and unspecified hyperlipidemia    Overactive bladder    sees Dr Pelayo, wears pessary    Personal history of breast cancer    sees Dr Khoury    Renal disorder    glomerulonephritis    S/P RUFINA (total abdominal hysterectomy)    Shingles    Unspecified essential hypertension    had SOB from toprol    Visual impairment     Past Surgical History:   Procedure Laterality Date    Cataract      Colonoscopy  2000    normal    Colonoscopy  ?    Hemorrhoidectomy      1999    Hysterectomy  1982    RUFINA for endometriosis, ovaries intact    Lumpectomy left  10/27/2015    LCIS    Mastectomy right  1988    with implant, then tram    Other surgical history  1962    acute glomerular nephritis    Other surgical history  1972    sacroiliac join fusion    Other surgical history  1990    LOBECTOMY LEFT UPPER LOBE    Other surgical history  1996    TRAM BREAST RECONSTRUCTION    Other surgical history  1996    FX LEFT ELBOW 2 SURGERIES 2 PLATES AND 12 SCREWS STILL IN ELBOW    Other surgical history  1997    SURGERY ON L5 &6    Other surgical  history  01/01/2001    BI LATERAL CATARACT REMOVAL AND LENS IMPLANTS    Other surgical history  01/01/2013    nodule removed by parotid gland-benign    Surgery - external  01/01/2014    with dr Pelayo    Surgery - external  10/01/2013    rectocele repair    Tonsillectomy      x2 once as a child and then as a adult      Family History   Problem Relation Age of Onset    Hypertension Brother     Hypertension Father     Prostate Cancer Father       reports that she has never smoked. She has never used smokeless tobacco. She reports current alcohol use. She reports that she does not use drugs.    Allergies:  Allergies[1]    Home Medications:  Prescriptions Prior to Admission[2]    Physical Exam:   General: Alert, orientated x3.  Cooperative.  No apparent distress.  Vital Signs:  Blood pressure (!) 175/60, pulse 50, temperature 97.6 °F (36.4 °C), temperature source Oral, resp. rate 18, height 65\", weight 116 lb (52.6 kg), SpO2 98%, not currently breastfeeding.  HEENT: Exam is unremarkable.  Without scleral icterus.    Neck: No tenderness to palpitation.  Full range of motion.   Lungs: Clear to auscultation bilaterally.  Cardiac: Regular rate and rhythm.   Abdomen:  Soft, non-distended, non-tender, with no rebound or guarding.  No peritoneal signs.   Extremities:  No lower extremity edema noted.  Without clubbing or cyanosis.    Skin: Normal texture and turgor.  Lymphatic:  No palpable cervical lymphadenopathy.  Neurologic: Cranial nerves are grossly intact.  Motor strength and sensory examination is grossly normal.  No focal neurologic deficit.  Pelvic:  deferred to OR    Laboratory Data:  Urodynamics:  DO at 130 ml, retirement 133 ml    Impression and Plan:  Overactive bladder  Urge incontinence      Proceed with Cystoscopy/Botox injection.  Risks, benefits and alternatives again discussed in detail and questions answered.      Total time spent with patient:  15 minutes    Kristofer Pelayo MD  11/19/2024  2:55 PM       [1]    Allergies  Allergen Reactions    Sulfa Antibiotics ANAPHYLAXIS   [2]   Medications Prior to Admission   Medication Sig Dispense Refill Last Dose/Taking    gabapentin 100 MG Oral Cap Take 1 capsule (100 mg total) by mouth nightly.   11/18/2024 Bedtime    losartan 25 MG Oral Tab Take 1 tablet (25 mg total) by mouth daily.   11/18/2024    estradiol (ESTRACE) 0.1 MG/GM Vaginal Cream Apply 1 gram vaginally 3 times per week. 42.5 g 3 Taking    ALPRAZOLAM 0.5 MG Oral Tab TAKE 1/2 TABLET BY MOUTH NIGHTLY AS NEEDED FOR ANXIETY  45 tablet 0 11/18/2024    hydrochlorothiazide 12.5 MG Oral Cap Take 1 capsule (12.5 mg total) by mouth daily. 90 capsule 3 11/18/2024    amLODIPine Besylate 10 MG Oral Tab Take 1 tablet (10 mg total) by mouth daily. 90 tablet 3 11/19/2024 at  9:00 AM    Levothyroxine Sodium 75 MCG Oral Tab Take 1 tablet (75 mcg total) by mouth daily. 90 tablet 3 11/19/2024 at  9:00 AM    Sertraline HCl 100 MG Oral Tab Take 1 tablet (100 mg total) by mouth daily. (Patient taking differently: Take 1 tablet (100 mg total) by mouth daily as needed.) 90 tablet 3 Taking Differently    carvedilol 25 MG Oral Tab Take 1 tablet (25 mg total) by mouth 2 (two) times daily with meals. (Patient taking differently: Take 6.5 mg by mouth 2 (two) times daily with meals.) 180 tablet 3 11/19/2024 at  9:00 AM    Pravastatin Sodium 20 MG Oral Tab Take 1 tablet (20 mg total) by mouth nightly. 90 tablet 3 11/18/2024    Cyanocobalamin (VITAMIN B 12) 100 MCG Oral Lozenge Take 1,000 mcg by mouth.   11/18/2024    VITAMIN D 2000 UNIT OR TABS 1 tablet daily   11/18/2024    POTASSIUM GLUCONATE 550 MG OR TABS Take by mouth as needed.   Taking As Needed    Trospium Chloride ER 60 MG Oral Capsule SR 24 Hr Take 1 capsule (60 mg total) by mouth daily. (Patient not taking: Reported on 8/22/2024) 30 capsule 11

## 2024-11-21 ENCOUNTER — TELEPHONE (OUTPATIENT)
Dept: UROLOGY | Facility: CLINIC | Age: 84
End: 2024-11-21

## 2024-11-21 DIAGNOSIS — R35.0 FREQUENCY OF MICTURITION: Primary | ICD-10-CM

## 2024-11-21 NOTE — TELEPHONE ENCOUNTER
TC from pt following surgery on  11/19/24  Procedure:   Cysto w/ bladder botox  C/o dysuria and freq last night. Feels better this am.  Denies UTI  voiding freely  Bowels reg  Denies pain  Tolerates ambulation  No vaginal bleeding  No CP or SOB    Plan-  Offered ucx  Encouraged ambulation  Encouraged po hydration  All questions answered  Encouraged her to call with questions or concerns  Follow up as scheduled  She understands and agrees to plan

## 2024-12-17 ENCOUNTER — TELEPHONE (OUTPATIENT)
Dept: UROLOGY | Facility: CLINIC | Age: 84
End: 2024-12-17

## 2024-12-17 NOTE — TELEPHONE ENCOUNTER
TC to RN line from pt following surgery on  11/19/24  Procedure:   Cysto w/ bladder botox        C/o UUI sx worse      Plan-  LM for pt on VM advising we rec a void assessment w/ PA tmrw or Monday.  Call  to schedule.  Encouraged her to call with questions or concerns

## 2025-01-06 ENCOUNTER — OFFICE VISIT (OUTPATIENT)
Dept: UROLOGY | Facility: CLINIC | Age: 85
End: 2025-01-06
Attending: OBSTETRICS & GYNECOLOGY
Payer: MEDICARE

## 2025-01-06 VITALS — HEIGHT: 65 IN | TEMPERATURE: 97 F | RESPIRATION RATE: 16 BRPM | BODY MASS INDEX: 19 KG/M2

## 2025-01-06 DIAGNOSIS — N32.81 DETRUSOR INSTABILITY: ICD-10-CM

## 2025-01-06 DIAGNOSIS — R33.9 INCOMPLETE BLADDER EMPTYING: ICD-10-CM

## 2025-01-06 DIAGNOSIS — N81.11 CYSTOCELE, MIDLINE: Primary | ICD-10-CM

## 2025-01-06 DIAGNOSIS — Z98.890 POST-OPERATIVE STATE: ICD-10-CM

## 2025-01-06 DIAGNOSIS — N95.2 POST-MENOPAUSAL ATROPHIC VAGINITIS: ICD-10-CM

## 2025-01-06 DIAGNOSIS — R35.1 NOCTURIA: ICD-10-CM

## 2025-01-06 DIAGNOSIS — N81.84 PELVIC MUSCLE WASTING: ICD-10-CM

## 2025-01-06 DIAGNOSIS — N39.41 URGE URINARY INCONTINENCE: ICD-10-CM

## 2025-01-06 PROCEDURE — 87086 URINE CULTURE/COLONY COUNT: CPT | Performed by: PHYSICIAN ASSISTANT

## 2025-01-06 PROCEDURE — 81002 URINALYSIS NONAUTO W/O SCOPE: CPT | Performed by: PHYSICIAN ASSISTANT

## 2025-01-06 PROCEDURE — 51701 INSERT BLADDER CATHETER: CPT | Performed by: PHYSICIAN ASSISTANT

## 2025-01-06 PROCEDURE — 99212 OFFICE O/P EST SF 10 MIN: CPT

## 2025-01-06 NOTE — PROGRESS NOTES
Pt presents for post op visit and f/u of prolapse / pessary check    S/p cysto with bladder botox (100 u) on 11/19/24 with Dr Pelayo    Reports 60% improvement, happy  Denies feeling of incomplete emptying  Denies current UTI s/sx  Bowels constipated, using miralax prn    Previously tried:  Myrbetriq, trospium, Gemtesa    UDS 10/9/24:  10 (in bathroom)/20 & 118/20 mL  [facility] 133 mL  DO @ 130 mL  No KEVIN    Using #3 ring with support with knob pessary, home care    Reports pessary is comfortable   Denies discharge  Denies bleeding  Pt is removing pessary at home every 1-2 weeks  Pt is using vaginal estrogen cream 2x weekly    Happy with pessary, feels supported  She is not currently interested in surgical management of her pelvic organ prolapse    Urogynecology Summary:  Urogynecology Summary  Prolapse: No  KEVIN: Yes  Urge Incontinence: Yes  Nocturia Frequency: 3  Frequency: 2 hours  Incomplete emptying: No  Constipation: Yes  Wears pad day?: 2 (light)  Wears Pad Night?: 1 (light)  Activities are limited by UI/POP?: No  Currently Sexually Active: No      Vitals:  Vitals:    01/06/25 0922   Resp: 16   Temp: 96.8 °F (36 °C)       GENERAL EXAM:  GENERAL: alert & oriented, NAD  HEENT: NC/AT, sclera without injection  SKIN: no rashes  LUNGS:  without increased respiratory effort  ABDOMEN: soft, non-tender, non-distended, no masses appreciated  EXT: no edema    PELVIC EXAM: ELIAN Moore, present for exam as a chaperone  Ext. Gen: + atrophy, no lesions  Urethra: caruncle, nontender  Bladder: some fullness, non tender  Vagina: +atrophy, no lesions, no ulcerations, no bleeding  Cervix: absent   Uterus: absent  Adnexa: no masses  Perineum: intact, non tender     PELVIS FLOOR NEUROMUSCULAR FUNCTION:  Strength: 2  Perineal Sensation: intact    PELVIC SUPPORT:  [place]: 0  Ant: 2  Post: 0  CST: neg    Pt voided 0 mL in the privacy of the bathroom  After obtaining verbal consent from the patient, sterile technique used to prep urethra and  collect urine.   mL.  Sent for culture    Her pessary was removed, cleaned, and reinserted without difficulty.     Impression:    ICD-10-CM    1. Cystocele, midline  N81.11       2. Nocturia  R35.1 Urine Culture, Routine     Straight Cath PVR     POCT urinalysis dipstick[09337]      3. Post-menopausal atrophic vaginitis  N95.2       4. Pelvic muscle wasting  N81.84       5. Urge urinary incontinence  N39.41       6. 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 Post-operative state  Z98.890       8. Incomplete bladder emptying  R33.9 Straight Cath PVR          Discussion Items:   Bowel management reviewed. Discussed using fiber daily w/ addition of miralax as needed.    Discussed mgmt of vulvovaginal atrophy with vaginal estrogen cream. Reviewed associated benefits, risks, alternatives, and goals. Recommend low dose 2x weekly mgmt.     Discussed management of pelvic organ prolapse including but not limited to behavioral modifications, conservative options, and surgical management.   Discussed pessary management including benefits and risks. Discussed importance of keeping regularly scheduled pessary checks in prevention of complications related to pessary use.     If developing UTIs discussed managing incomplete bladder emptying (mentioned CISC)    Treatment Plan:  Urine collected via straight cath & sent for Cx, follow result, tx if +.  Continue pessary management, home care  Continue vag estrogen as prescribed  Encouraged daily pelvic exercises  Cont bowel regimen  Call with s/sx of UTI, problems with pessary     All questions answered  Pt understands and agrees to plan       Return in about 3 months (around 4/6/2025) for UUI, pessary check.      Suni Mercedes PA-C    Note to patient: The 21st Century Cures Act makes medical notes like these available to patients in the interest of transparency.  However, be advised this is a medical document.  It is intended as peer to peer communication.  It is written in  medical language and may contain abbreviations or verbiage that are unfamiliar.  It may appear blunt or direct.  Medical documents are intended to carry relevant information, facts as evident, and the clinical opinion of the practitioner.

## 2025-01-08 ENCOUNTER — TELEPHONE (OUTPATIENT)
Dept: UROLOGY | Facility: CLINIC | Age: 85
End: 2025-01-08

## 2025-04-07 ENCOUNTER — OFFICE VISIT (OUTPATIENT)
Dept: UROLOGY | Facility: CLINIC | Age: 85
End: 2025-04-07
Attending: OBSTETRICS & GYNECOLOGY
Payer: MEDICARE

## 2025-04-07 VITALS — BODY MASS INDEX: 19 KG/M2 | HEIGHT: 65 IN | RESPIRATION RATE: 16 BRPM | TEMPERATURE: 97 F

## 2025-04-07 DIAGNOSIS — N81.84 PELVIC MUSCLE WASTING: ICD-10-CM

## 2025-04-07 DIAGNOSIS — N95.2 POST-MENOPAUSAL ATROPHIC VAGINITIS: ICD-10-CM

## 2025-04-07 DIAGNOSIS — N39.41 URGE URINARY INCONTINENCE: ICD-10-CM

## 2025-04-07 DIAGNOSIS — N81.11 CYSTOCELE, MIDLINE: ICD-10-CM

## 2025-04-07 DIAGNOSIS — N32.81 DETRUSOR INSTABILITY: Primary | ICD-10-CM

## 2025-04-07 DIAGNOSIS — R35.1 NOCTURIA: ICD-10-CM

## 2025-04-07 PROCEDURE — 99212 OFFICE O/P EST SF 10 MIN: CPT

## 2025-04-07 NOTE — PROGRESS NOTES
Patient presents to follow up DO/UUI    S/p cysto with bladder botox (100 u) on 11/19/24 with Dr Pelayo     Feels botox starting to wear off, doesn't want to repeat yet, possibly later this year    Not interested in PTNS    Using vag estrogen 3x weekly  Bowels constipated  Denies current UTI sx, no hx of rUTIs    Previously tried:  Myrbetriq, trospium, Gemtesa     UDS 10/9/24:  10 (in bathroom)/20 & 118/20 mL  [facility] 133 mL  DO @ 130 mL  No KEVIN     Using #3 ring with support with knob pessary, home care     Reports pessary is comfortable   Denies discharge  Denies bleeding  Pt is removing pessary at home every 2-3 weeks  Pt is using vaginal estrogen cream 2x weekly     Happy with pessary, feels supported  She is not currently interested in surgical management of her pelvic organ prolapse    Urogynecology Summary:  Urogynecology Summary  Prolapse: No  KEVIN: No  Urge Incontinence: Yes  Nocturia Frequency: 3  Frequency: 2 hours  Incomplete emptying: No  Constipation: Yes  Wears pad day?: 2 (light)  Wears Pad Night?: 1  Activities are limited by UI/POP?: No  Currently Sexually Active: No    Vitals:  Temp 97 °F (36.1 °C)   Resp 16   Ht 65\"   BMI 19.30 kg/m²     GENERAL EXAM:  GENERAL: alert & oriented, NAD  HEENT: NC/AT, sclera without injection  SKIN: no rashes  LUNGS:  without increased respiratory effort  ABDOMEN: soft, non-tender, non-distended, no masses appreciated  EXT: no edema    PELVIC EXAM: ELIAN Moore, present for exam as a chaperone  Ext. Gen: + atrophy, no lesions  Urethra: caruncle, nontender  Bladder: some fullness, non tender  Vagina: +atrophy, no lesions, no ulcerations, no bleeding  Cervix: absent   Uterus: absent  Adnexa: no masses  Perineum: intact, non tender     PELVIS FLOOR NEUROMUSCULAR FUNCTION:  Strength: 2  Perineal Sensation: intact     PELVIC SUPPORT:  [place]: 0  Ant: 2  Post: 0  CST: neg    Her pessary was removed, cleaned, and reinserted without difficulty.     Impression/Plan:    ICD-10-CM    1.  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 Urge urinary incontinence  N39.41       3. Nocturia  R35.1       4. Cystocele, midline  N81.11       5. Post-menopausal atrophic vaginitis  N95.2       6. 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 Discussed using fiber daily w/ addition of miralax as needed.    Discussed mgmt of vulvovaginal atrophy with vaginal estrogen cream. Reviewed associated benefits, risks, alternatives, and goals. Recommend low dose 3x/week treatment.    Discussed management of pelvic organ prolapse including but not limited to behavioral modifications, conservative options including PT & pessary use, and surgical management.   Discussed pessary management including benefits and risks. Discussed importance of keeping regularly scheduled pessary checks in prevention of complications related to pessary use.     Discussed management options for OAB/UUI including risks/benefits of expectant management, vaginal estrogen cream, PFPT, OAB meds, PTNS, bladder Botox.    Treatment Plan:  F/u bladder botox PRN (given surgery scheduler info)  Continue pessary use, home management  Continue vag estrogen cream as prescribed  Bowel regimen  Bladder diet/drill  Pelvic floor exercises  Call with s/sx of UTI    All questions answered  She understands and agrees to plan    Return in about 1 year (around 4/7/2026) for pessary care (bladder botox PRN with 3 week post op void assessment & UCx).    Suni Mercedes PA-C    Note to patient: The 21st Century Cures Act makes medical notes like these available to patients in the interest of transparency.  However, be advised this is a medical document.  It is intended as peer to peer communication.  It is written in medical language and may contain abbreviations or verbiage that are unfamiliar.  It may appear blunt or direct.  Medical documents are intended to carry relevant information, facts as evident, and the clinical opinion of the  practitioner.

## 2025-06-25 RX ORDER — ESTRADIOL 0.1 MG/G
1 CREAM VAGINAL
Qty: 42.5 G | Refills: 3 | Status: SHIPPED | OUTPATIENT
Start: 2025-06-25

## (undated) DEVICE — PACK GYNE CUSTOM

## (undated) DEVICE — GLOVE SUR 7.5 SENSICARE PI PIP CRM PWD F

## (undated) DEVICE — SOLUTION IV 1000ML DIL ST H2O

## (undated) DEVICE — Device: Brand: INJETAK ADJUSTABLE TIP NEEDLE 35CM

## (undated) DEVICE — SLEEVE COMPR MD KNEE LEN SGL USE KENDALL SCD

## (undated) DEVICE — SET TUBING DELTER CYSTO IRRIG L77IN DIA0.241IN BLDR NVENT

## (undated) DEVICE — PREMIUM WET SKIN PREP TRAY: Brand: MEDLINE INDUSTRIES, INC.

## (undated) NOTE — LETTER
[address]  [address]  Authorization for Surgical Operation and Procedure     Date:___________                                                                                                         Time:__________  I hereby authorize Surgeon(s):  Kristofer Pelayo MD, my physician and his/her assistants (if applicable), which may include medical students, residents, and/or fellows, to perform the following surgical operation/ procedure and administer such anesthesia as may be determined necessary by my physician:  Operation/Procedure name (s) Procedure(s):  CYSTOSCOPY WITH BOTOX INJECTION on Faith Wu [facility]   2.   I recognize that during the surgical operation/procedure, unforeseen conditions may necessitate additional or different procedures than those listed above.  I, therefore, further authorize and request that the above-named surgeon, assistants, or designees perform such procedures as are, in their judgment, necessary and desirable.    3.   My surgeon/physician has discussed prior to my surgery the potential benefits, risks and side effects of this procedure; the likelihood of achieving goals; and potential problems that might occur during recuperation.  They also discussed reasonable alternatives to the procedure, including risks, benefits, and side effects related to the alternatives and risks related to not receiving this procedure.  I have had all my questions answered and I acknowledge that no guarantee has been made as to the result that may be obtained.    4.   Should the need arise during my operation/procedure, which includes change of level of care prior to discharge, I also consent to the administration of blood and/or blood products.  Further, I understand that despite careful testing and screening of blood or blood products by collecting agencies, I may still be subject to ill effects as a result of receiving a blood transfusion and/or blood products.   The following are some, but not all, of the potential risks that can occur: fever and allergic reactions, hemolytic reactions, transmission of diseases such as Hepatitis, AIDS and Cytomegalovirus (CMV) and fluid overload.  In the event that I wish to have an autologous transfusion of my own blood, or a directed donor transfusion, I will discuss this with my physician.  Check only if Refusing Blood or Blood Products  I understand refusal of blood or blood products as deemed necessary by my physician may have serious consequences to my condition to include possible death. I hereby assume responsibility for my refusal and release the hospital, its personnel, and my physicians from any responsibility for the consequences of my refusal.          o  Refuse      5.   I authorize the use of any specimen, organs, tissues, body parts or foreign objects that may be removed from my body during the operation/procedure for diagnosis, research or teaching purposes and their subsequent disposal by hospital authorities.  I also authorize the release of specimen test results and/or written reports to my treating physician on the hospital medical staff or other referring or consulting physicians involved in my care, at the discretion of the Pathologist or my treating physician.    6.   I consent to the photographing or videotaping of the operations or procedures to be performed, including appropriate portions of my body for medical, scientific, or educational purposes, provided my identity is not revealed by the pictures or by descriptive texts accompanying them.  If the procedure has been photographed/videotaped, the surgeon will obtain the original picture, image, videotape or CD.  The hospital will not be responsible for storage, release or maintenance of the picture, image, tape or CD.    7.   I consent to the presence of a  or observers in the operating room as deemed necessary by my physician or their  designees.    8.   I recognize that in the event my procedure results in extended X-Ray/fluoroscopy time, I may develop a skin reaction.    9. If I have a Do Not Attempt Resuscitation (DNAR) order in place, that status will be suspended while in the operating room, procedural suite, and during the recovery period unless otherwise explicitly stated by me (or a person authorized to consent on my behalf). The surgeon or my attending physician will determine when the applicable recovery period ends for purposes of reinstating the DNAR order.  10. Patients having a sterilization procedure: I understand that if the procedure is successful the results will be permanent and it will therefore be impossible for me to inseminate, conceive, or bear children.  I also understand that the procedure is intended to result in sterility, although the result has not been guaranteed.   11. I acknowledge that my physician has explained sedation/analgesia administration to me including the risk and benefits I consent to the administration of sedation/analgesia as may be necessary or desirable in the judgment of my physician.    I CERTIFY THAT I HAVE READ AND FULLY UNDERSTAND THE ABOVE CONSENT TO OPERATION and/or OTHER PROCEDURE.    _________________________________________  __________________________________  Signature of Patient     Signature of Responsible Person         ___________________________________         Printed Name of Responsible Person           _________________________________                 Relationship to Patient  _________________________________________  ______________________________  Signature of Witness          Date  Time      Patient Name: Faith Gordon     : 1940                 Printed: 2024     Medical Record #: [id number]                     Page [address] of [address] [place] [address] [place]  [address]    Consent for Anesthesia    IFaith  Jazmin Gordon agree to be cared for by an anesthesiologist, who is specially trained to monitor me and give me medicine to put me to sleep or keep me comfortable during my procedure    I understand that my anesthesiologist is not an employee or agent of [facility] [employer]. He or she works for [employer].    As the patient asking for anesthesia services, I agree to:  Allow the anesthesiologist (anesthesia doctor) to give me medicine and do additional procedures as necessary. Some examples are: Starting or using an “IV” to give me medicine, fluids or blood during my procedure, and having a breathing tube placed to help me breathe when I’m asleep (intubation). In the event that my heart stops working properly, I understand that my anesthesiologist will make every effort to sustain my life, unless otherwise directed by [facility] Do Not Resuscitate documents.  Tell my anesthesia doctor before my procedure:  If I am pregnant.  The last time that I ate or drank.  All of the medicines I take (including prescriptions, herbal supplements, and pills I can buy without a prescription (including street drugs/illegal medications). Failure to inform my anesthesiologist about these medicines may increase my risk of anesthetic complications.  If I am allergic to anything or have had a reaction to anesthesia before.  I understand how the anesthesia medicine will help me (benefits).  I understand that with any type of anesthesia medicine there are risks:  The most common risks are: nausea, vomiting, sore throat, muscle soreness, damage to my eyes, mouth, or teeth (from breathing tube placement).  Rare risks include: remembering what happened during my procedure, allergic reactions to medications, injury to my airway, heart, lungs, vision, nerves, or muscles and in extremely rare instances death.  My doctor has explained to me other choices available to me for my care  (alternatives).  Pregnant Patients (“epidural”):  I understand that the risks of having an epidural (medicine given into my back to help control pain during labor), include itching, low blood pressure, difficulty urinating, headache or slowing of the baby’s heart. Very rare risks include infection, bleeding, seizure, irregular heart rhythms and nerve injury.  Regional Anesthesia (“spinal”, “epidural”, & “nerve blocks”):  I understand that rare but potential complications include headache, bleeding, infection, seizure, irregular heart rhythms, and nerve injury.    I can change my mind about having anesthesia services at any time before I get the medicine.    _____________________________________________________________________________  Patient (or Representative) Signature/Relationship to Patient  Date   Time    _____________________________________________________________________________   Name (if used)    Language/Organization   Time    _____________________________________________________________________________  Anesthesiologist Signature     Date   Time  I have discussed the procedure and information above with the patient (or patient’s representative) and answered their questions. The patient or their representative has agreed to have anesthesia services.    _____________________________________________________________________________  Witness        Date   Time  I have verified that the signature is that of the patient or patient’s representative, and that it was signed before the procedure  Patient Name: Faith Gordon     : 1940                 Printed: 2024     Medical Record #: [id number]                     Page 2 of 2

## (undated) NOTE — LETTER
[address]  [address]  Authorization for Surgical Operation and Procedure     Date:___________                                                                                                         Time:__________  I hereby authorize Surgeon(s):  Kristofer Pelayo MD, my physician and his/her assistants (if applicable), which may include medical students, residents, and/or fellows, to perform the following surgical operation/ procedure and administer such anesthesia as may be determined necessary by my physician:  Operation/Procedure name (s) Procedure(s):  CYSTOSCOPY WITH BOTOX INJECTION on Faith Wu [facility]   2.   I recognize that during the surgical operation/procedure, unforeseen conditions may necessitate additional or different procedures than those listed above.  I, therefore, further authorize and request that the above-named surgeon, assistants, or designees perform such procedures as are, in their judgment, necessary and desirable.    3.   My surgeon/physician has discussed prior to my surgery the potential benefits, risks and side effects of this procedure; the likelihood of achieving goals; and potential problems that might occur during recuperation.  They also discussed reasonable alternatives to the procedure, including risks, benefits, and side effects related to the alternatives and risks related to not receiving this procedure.  I have had all my questions answered and I acknowledge that no guarantee has been made as to the result that may be obtained.    4.   Should the need arise during my operation/procedure, which includes change of level of care prior to discharge, I also consent to the administration of blood and/or blood products.  Further, I understand that despite careful testing and screening of blood or blood products by collecting agencies, I may still be subject to ill effects as a result of receiving a blood transfusion and/or blood products.   The following are some, but not all, of the potential risks that can occur: fever and allergic reactions, hemolytic reactions, transmission of diseases such as Hepatitis, AIDS and Cytomegalovirus (CMV) and fluid overload.  In the event that I wish to have an autologous transfusion of my own blood, or a directed donor transfusion, I will discuss this with my physician.  Check only if Refusing Blood or Blood Products  I understand refusal of blood or blood products as deemed necessary by my physician may have serious consequences to my condition to include possible death. I hereby assume responsibility for my refusal and release the hospital, its personnel, and my physicians from any responsibility for the consequences of my refusal.          o  Refuse      5.   I authorize the use of any specimen, organs, tissues, body parts or foreign objects that may be removed from my body during the operation/procedure for diagnosis, research or teaching purposes and their subsequent disposal by hospital authorities.  I also authorize the release of specimen test results and/or written reports to my treating physician on the hospital medical staff or other referring or consulting physicians involved in my care, at the discretion of the Pathologist or my treating physician.    6.   I consent to the photographing or videotaping of the operations or procedures to be performed, including appropriate portions of my body for medical, scientific, or educational purposes, provided my identity is not revealed by the pictures or by descriptive texts accompanying them.  If the procedure has been photographed/videotaped, the surgeon will obtain the original picture, image, videotape or CD.  The hospital will not be responsible for storage, release or maintenance of the picture, image, tape or CD.    7.   I consent to the presence of a  or observers in the operating room as deemed necessary by my physician or their  designees.    8.   I recognize that in the event my procedure results in extended X-Ray/fluoroscopy time, I may develop a skin reaction.    9. If I have a Do Not Attempt Resuscitation (DNAR) order in place, that status will be suspended while in the operating room, procedural suite, and during the recovery period unless otherwise explicitly stated by me (or a person authorized to consent on my behalf). The surgeon or my attending physician will determine when the applicable recovery period ends for purposes of reinstating the DNAR order.  10. Patients having a sterilization procedure: I understand that if the procedure is successful the results will be permanent and it will therefore be impossible for me to inseminate, conceive, or bear children.  I also understand that the procedure is intended to result in sterility, although the result has not been guaranteed.   11. I acknowledge that my physician has explained sedation/analgesia administration to me including the risk and benefits I consent to the administration of sedation/analgesia as may be necessary or desirable in the judgment of my physician.    I CERTIFY THAT I HAVE READ AND FULLY UNDERSTAND THE ABOVE CONSENT TO OPERATION and/or OTHER PROCEDURE.    _________________________________________  __________________________________  Signature of Patient     Signature of Responsible Person         ___________________________________         Printed Name of Responsible Person           _________________________________                 Relationship to Patient  _________________________________________  ______________________________  Signature of Witness          Date  Time      Patient Name: Faith Gordon     : 1940                 Printed: 2024     Medical Record #: [id number]                     Page 1 of [address] [place] [address] [place]  [address]    Consent for Anesthesia    IFaith  Jazmin Gordon agree to be cared for by an anesthesiologist, who is specially trained to monitor me and give me medicine to put me to sleep or keep me comfortable during my procedure    I understand that my anesthesiologist is not an employee or agent of [facility] [employer]. He or she works for [employer].    As the patient asking for anesthesia services, I agree to:  Allow the anesthesiologist (anesthesia doctor) to give me medicine and do additional procedures as necessary. Some examples are: Starting or using an “IV” to give me medicine, fluids or blood during my procedure, and having a breathing tube placed to help me breathe when I’m asleep (intubation). In the event that my heart stops working properly, I understand that my anesthesiologist will make every effort to sustain my life, unless otherwise directed by [facility] Do Not Resuscitate documents.  Tell my anesthesia doctor before my procedure:  If I am pregnant.  The last time that I ate or drank.  All of the medicines I take (including prescriptions, herbal supplements, and pills I can buy without a prescription (including street drugs/illegal medications). Failure to inform my anesthesiologist about these medicines may increase my risk of anesthetic complications.  If I am allergic to anything or have had a reaction to anesthesia before.  I understand how the anesthesia medicine will help me (benefits).  I understand that with any type of anesthesia medicine there are risks:  The most common risks are: nausea, vomiting, sore throat, muscle soreness, damage to my eyes, mouth, or teeth (from breathing tube placement).  Rare risks include: remembering what happened during my procedure, allergic reactions to medications, injury to my airway, heart, lungs, vision, nerves, or muscles and in extremely rare instances death.  My doctor has explained to me other choices available to me for my care  (alternatives).  Pregnant Patients (“epidural”):  I understand that the risks of having an epidural (medicine given into my back to help control pain during labor), include itching, low blood pressure, difficulty urinating, headache or slowing of the baby’s heart. Very rare risks include infection, bleeding, seizure, irregular heart rhythms and nerve injury.  Regional Anesthesia (“spinal”, “epidural”, & “nerve blocks”):  I understand that rare but potential complications include headache, bleeding, infection, seizure, irregular heart rhythms, and nerve injury.    I can change my mind about having anesthesia services at any time before I get the medicine.    _____________________________________________________________________________  Patient (or Representative) Signature/Relationship to Patient  Date   Time    _____________________________________________________________________________   Name (if used)    Language/Organization   Time    _____________________________________________________________________________  Anesthesiologist Signature     Date   Time  I have discussed the procedure and information above with the patient (or patient’s representative) and answered their questions. The patient or their representative has agreed to have anesthesia services.    _____________________________________________________________________________  Witness        Date   Time  I have verified that the signature is that of the patient or patient’s representative, and that it was signed before the procedure  Patient Name: Faith Gordon     : 1940                 Printed: 2024     Medical Record #: [id number]                     Page 2 of 2

## (undated) NOTE — LETTER
19    Patient: Minor Fear  : 1940 Visit date: 2019    Dear  Dr. Lacey Greene MD,    Thank you for referring Minor Fear to my practice. Please find my assessment and plan below.       Assessment   Grade III internal hemorrhoids  (prim

## (undated) NOTE — LETTER
19    Patient: Meryl Ritter  : 1940 Visit date: 2019    Dear  Dr. Mikal Rios MD,    Thank you for referring Meryl Ritter to my practice. Please find my assessment and plan below.         Assessment   Grade III internal hemorrhoids  (

## (undated) NOTE — LETTER
[address]  [address]  Authorization for Surgical Operation and Procedure     Date:___________                                                                                                         Time:__________  I hereby authorize Surgeon(s):  Kristofer Pelayo MD, my physician and his/her assistants (if applicable), which may include medical students, residents, and/or fellows, to perform the following surgical operation/ procedure and administer such anesthesia as may be determined necessary by my physician:  Operation/Procedure name (s) Procedure(s):  CYSTOSCOPY WITH BOTOX INJECTION on Faith Wu [facility]   2.   I recognize that during the surgical operation/procedure, unforeseen conditions may necessitate additional or different procedures than those listed above.  I, therefore, further authorize and request that the above-named surgeon, assistants, or designees perform such procedures as are, in their judgment, necessary and desirable.    3.   My surgeon/physician has discussed prior to my surgery the potential benefits, risks and side effects of this procedure; the likelihood of achieving goals; and potential problems that might occur during recuperation.  They also discussed reasonable alternatives to the procedure, including risks, benefits, and side effects related to the alternatives and risks related to not receiving this procedure.  I have had all my questions answered and I acknowledge that no guarantee has been made as to the result that may be obtained.    4.   Should the need arise during my operation/procedure, which includes change of level of care prior to discharge, I also consent to the administration of blood and/or blood products.  Further, I understand that despite careful testing and screening of blood or blood products by collecting agencies, I may still be subject to ill effects as a result of receiving a blood transfusion and/or blood products.   The following are some, but not all, of the potential risks that can occur: fever and allergic reactions, hemolytic reactions, transmission of diseases such as Hepatitis, AIDS and Cytomegalovirus (CMV) and fluid overload.  In the event that I wish to have an autologous transfusion of my own blood, or a directed donor transfusion, I will discuss this with my physician.  Check only if Refusing Blood or Blood Products  I understand refusal of blood or blood products as deemed necessary by my physician may have serious consequences to my condition to include possible death. I hereby assume responsibility for my refusal and release the hospital, its personnel, and my physicians from any responsibility for the consequences of my refusal.          o  Refuse      5.   I authorize the use of any specimen, organs, tissues, body parts or foreign objects that may be removed from my body during the operation/procedure for diagnosis, research or teaching purposes and their subsequent disposal by hospital authorities.  I also authorize the release of specimen test results and/or written reports to my treating physician on the hospital medical staff or other referring or consulting physicians involved in my care, at the discretion of the Pathologist or my treating physician.    6.   I consent to the photographing or videotaping of the operations or procedures to be performed, including appropriate portions of my body for medical, scientific, or educational purposes, provided my identity is not revealed by the pictures or by descriptive texts accompanying them.  If the procedure has been photographed/videotaped, the surgeon will obtain the original picture, image, videotape or CD.  The hospital will not be responsible for storage, release or maintenance of the picture, image, tape or CD.    7.   I consent to the presence of a  or observers in the operating room as deemed necessary by my physician or their  designees.    8.   I recognize that in the event my procedure results in extended X-Ray/fluoroscopy time, I may develop a skin reaction.    9. If I have a Do Not Attempt Resuscitation (DNAR) order in place, that status will be suspended while in the operating room, procedural suite, and during the recovery period unless otherwise explicitly stated by me (or a person authorized to consent on my behalf). The surgeon or my attending physician will determine when the applicable recovery period ends for purposes of reinstating the DNAR order.  10. Patients having a sterilization procedure: I understand that if the procedure is successful the results will be permanent and it will therefore be impossible for me to inseminate, conceive, or bear children.  I also understand that the procedure is intended to result in sterility, although the result has not been guaranteed.   11. I acknowledge that my physician has explained sedation/analgesia administration to me including the risk and benefits I consent to the administration of sedation/analgesia as may be necessary or desirable in the judgment of my physician.    I CERTIFY THAT I HAVE READ AND FULLY UNDERSTAND THE ABOVE CONSENT TO OPERATION and/or OTHER PROCEDURE.    _________________________________________  __________________________________  Signature of Patient     Signature of Responsible Person         ___________________________________         Printed Name of Responsible Person           _________________________________                 Relationship to Patient  _________________________________________  ______________________________  Signature of Witness          Date  Time      Patient Name: Faith Gordon     : 1940                 Printed: 2024     Medical Record #: [id number]                     Page 1 of [address] [place] [address] [place]  [address]    Consent for Anesthesia    IFaith  Jazmin Gordon agree to be cared for by an anesthesiologist, who is specially trained to monitor me and give me medicine to put me to sleep or keep me comfortable during my procedure    I understand that my anesthesiologist is not an employee or agent of [facility] [employer]. He or she works for [employer].    As the patient asking for anesthesia services, I agree to:  Allow the anesthesiologist (anesthesia doctor) to give me medicine and do additional procedures as necessary. Some examples are: Starting or using an “IV” to give me medicine, fluids or blood during my procedure, and having a breathing tube placed to help me breathe when I’m asleep (intubation). In the event that my heart stops working properly, I understand that my anesthesiologist will make every effort to sustain my life, unless otherwise directed by [facility] Do Not Resuscitate documents.  Tell my anesthesia doctor before my procedure:  If I am pregnant.  The last time that I ate or drank.  All of the medicines I take (including prescriptions, herbal supplements, and pills I can buy without a prescription (including street drugs/illegal medications). Failure to inform my anesthesiologist about these medicines may increase my risk of anesthetic complications.  If I am allergic to anything or have had a reaction to anesthesia before.  I understand how the anesthesia medicine will help me (benefits).  I understand that with any type of anesthesia medicine there are risks:  The most common risks are: nausea, vomiting, sore throat, muscle soreness, damage to my eyes, mouth, or teeth (from breathing tube placement).  Rare risks include: remembering what happened during my procedure, allergic reactions to medications, injury to my airway, heart, lungs, vision, nerves, or muscles and in extremely rare instances death.  My doctor has explained to me other choices available to me for my care  (alternatives).  Pregnant Patients (“epidural”):  I understand that the risks of having an epidural (medicine given into my back to help control pain during labor), include itching, low blood pressure, difficulty urinating, headache or slowing of the baby’s heart. Very rare risks include infection, bleeding, seizure, irregular heart rhythms and nerve injury.  Regional Anesthesia (“spinal”, “epidural”, & “nerve blocks”):  I understand that rare but potential complications include headache, bleeding, infection, seizure, irregular heart rhythms, and nerve injury.    I can change my mind about having anesthesia services at any time before I get the medicine.    _____________________________________________________________________________  Patient (or Representative) Signature/Relationship to Patient  Date   Time    _____________________________________________________________________________   Name (if used)    Language/Organization   Time    _____________________________________________________________________________  Anesthesiologist Signature     Date   Time  I have discussed the procedure and information above with the patient (or patient’s representative) and answered their questions. The patient or their representative has agreed to have anesthesia services.    _____________________________________________________________________________  Witness        Date   Time  I have verified that the signature is that of the patient or patient’s representative, and that it was signed before the procedure  Patient Name: Faith Gordon     : 1940                 Printed: 2024     Medical Record #: [id number]                     Page 2 of 2

## (undated) NOTE — LETTER
19    Patient: Lynn Escudero  : 1940 Visit date: 2019    Dear  Dr. Dino Hewitt MD,    Thank you for referring Lynn Escudero to my practice. Please find my assessment and plan below.                 Assessment   Grade III internal hemorrh Abdomen: The abdomen is soft, nondistended, mildly tender in the suprapubic region. Bowel sounds are normal activity normal pitch. There is no rebound tenderness or guarding. There are no signs of ascites or peritonitis.   Liver and spleen are nonpalpabl

## (undated) NOTE — LETTER
10/08/19    Patient: Marc Leyva  : 1940 Visit date: 10/7/2019    Dear  Dr. Augustine Agiular MD,    Thank you for referring Marc Leyva to my practice. Please find my assessment and plan below.         Assessment   Grade III internal hemorrhoids  (p

## (undated) NOTE — LETTER
OUTSIDE TESTING RESULT REQUEST     IMPORTANT: FOR YOUR IMMEDIATE ATTENTION  Please FAX all test results listed below to: [phone]     Testing already done on or about: 2024     * * * * If testing is NOT complete, arrange with patient A.S.A.P. * * * *      Patient Name: Faith Gordon  Surgery Date: 2024  Medical Record: [id number]  CSN: [id number]  : 1940 - A: 83 y     Sex: female  Surgeon(s):  Kristofer Pelayo MD  Procedure: CYSTOSCOPY WITH BOTOX INJECTION  Anesthesia Type: Choice     Surgeon: Kristofer Pelayo MD     The following Testing and Time Line are REQUIRED PER ANESTHESIA     Electrolytes within 21 days      Thank You,   Sent by: Janet AMBROSE

## (undated) NOTE — LETTER
19    Patient: Quentin Castillo  : 1940 Visit date: 2019    Dear  Dr. Juanita Martínez MD,    Thank you for referring Quentin Castillo to my practice. Please find my assessment and plan below.       Assessment   Grade III internal hemorrhoids  (jorge

## (undated) NOTE — LETTER
19    Patient: Jocelyn Jett  : 1940 Visit date: 2019    Dear  Dr. Aman Chester MD,    Thank you for referring Jocelyn Jett to my practice. Please find my assessment and plan below.         Assessment   Grade III internal hemorrhoids  (p